# Patient Record
Sex: FEMALE | Race: WHITE | Employment: FULL TIME | URBAN - METROPOLITAN AREA
[De-identification: names, ages, dates, MRNs, and addresses within clinical notes are randomized per-mention and may not be internally consistent; named-entity substitution may affect disease eponyms.]

---

## 2020-08-26 ENCOUNTER — OFFICE VISIT (OUTPATIENT)
Dept: PRIMARY CARE CLINIC | Age: 40
End: 2020-08-26
Payer: COMMERCIAL

## 2020-08-26 VITALS
WEIGHT: 118.4 LBS | SYSTOLIC BLOOD PRESSURE: 118 MMHG | HEIGHT: 63 IN | BODY MASS INDEX: 20.98 KG/M2 | RESPIRATION RATE: 14 BRPM | DIASTOLIC BLOOD PRESSURE: 64 MMHG | HEART RATE: 65 BPM | OXYGEN SATURATION: 97 % | TEMPERATURE: 98.3 F

## 2020-08-26 DIAGNOSIS — L60.8 PITTING OF NAILS: ICD-10-CM

## 2020-08-26 DIAGNOSIS — Z13.220 SCREENING CHOLESTEROL LEVEL: ICD-10-CM

## 2020-08-26 DIAGNOSIS — Z11.4 SCREENING FOR HIV (HUMAN IMMUNODEFICIENCY VIRUS): ICD-10-CM

## 2020-08-26 LAB
A/G RATIO: 2.2 (ref 1.1–2.2)
ALBUMIN SERPL-MCNC: 4.8 G/DL (ref 3.4–5)
ALP BLD-CCNC: 49 U/L (ref 40–129)
ALT SERPL-CCNC: 18 U/L (ref 10–40)
ANION GAP SERPL CALCULATED.3IONS-SCNC: 12 MMOL/L (ref 3–16)
AST SERPL-CCNC: 22 U/L (ref 15–37)
BASOPHILS ABSOLUTE: 0 K/UL (ref 0–0.2)
BASOPHILS RELATIVE PERCENT: 0.7 %
BILIRUB SERPL-MCNC: 0.5 MG/DL (ref 0–1)
BUN BLDV-MCNC: 12 MG/DL (ref 7–20)
CALCIUM SERPL-MCNC: 9.7 MG/DL (ref 8.3–10.6)
CHLORIDE BLD-SCNC: 100 MMOL/L (ref 99–110)
CHOLESTEROL, TOTAL: 206 MG/DL (ref 0–199)
CO2: 25 MMOL/L (ref 21–32)
CREAT SERPL-MCNC: 0.7 MG/DL (ref 0.6–1.1)
EOSINOPHILS ABSOLUTE: 0.1 K/UL (ref 0–0.6)
EOSINOPHILS RELATIVE PERCENT: 1.6 %
GFR AFRICAN AMERICAN: >60
GFR NON-AFRICAN AMERICAN: >60
GLOBULIN: 2.2 G/DL
GLUCOSE BLD-MCNC: 85 MG/DL (ref 70–99)
HCT VFR BLD CALC: 42.5 % (ref 36–48)
HDLC SERPL-MCNC: 68 MG/DL (ref 40–60)
HEMOGLOBIN: 14.3 G/DL (ref 12–16)
LDL CHOLESTEROL CALCULATED: 124 MG/DL
LYMPHOCYTES ABSOLUTE: 1.8 K/UL (ref 1–5.1)
LYMPHOCYTES RELATIVE PERCENT: 29.3 %
MCH RBC QN AUTO: 30.4 PG (ref 26–34)
MCHC RBC AUTO-ENTMCNC: 33.6 G/DL (ref 31–36)
MCV RBC AUTO: 90.4 FL (ref 80–100)
MONOCYTES ABSOLUTE: 0.5 K/UL (ref 0–1.3)
MONOCYTES RELATIVE PERCENT: 7.8 %
NEUTROPHILS ABSOLUTE: 3.7 K/UL (ref 1.7–7.7)
NEUTROPHILS RELATIVE PERCENT: 60.6 %
PDW BLD-RTO: 13.5 % (ref 12.4–15.4)
PLATELET # BLD: 215 K/UL (ref 135–450)
PMV BLD AUTO: 8.7 FL (ref 5–10.5)
POTASSIUM SERPL-SCNC: 3.9 MMOL/L (ref 3.5–5.1)
RBC # BLD: 4.7 M/UL (ref 4–5.2)
SODIUM BLD-SCNC: 137 MMOL/L (ref 136–145)
TOTAL PROTEIN: 7 G/DL (ref 6.4–8.2)
TRIGL SERPL-MCNC: 68 MG/DL (ref 0–150)
VLDLC SERPL CALC-MCNC: 14 MG/DL
WBC # BLD: 6.2 K/UL (ref 4–11)

## 2020-08-26 PROCEDURE — 99386 PREV VISIT NEW AGE 40-64: CPT | Performed by: STUDENT IN AN ORGANIZED HEALTH CARE EDUCATION/TRAINING PROGRAM

## 2020-08-26 SDOH — HEALTH STABILITY: MENTAL HEALTH: HOW OFTEN DO YOU HAVE A DRINK CONTAINING ALCOHOL?: 2-3 TIMES A WEEK

## 2020-08-26 SDOH — HEALTH STABILITY: MENTAL HEALTH: HOW MANY STANDARD DRINKS CONTAINING ALCOHOL DO YOU HAVE ON A TYPICAL DAY?: 1 OR 2

## 2020-08-26 ASSESSMENT — ENCOUNTER SYMPTOMS
NAUSEA: 0
VOMITING: 0
SORE THROAT: 0
DIARRHEA: 0
RHINORRHEA: 0
COUGH: 0
SHORTNESS OF BREATH: 0

## 2020-08-26 ASSESSMENT — PATIENT HEALTH QUESTIONNAIRE - PHQ9
SUM OF ALL RESPONSES TO PHQ QUESTIONS 1-9: 1
SUM OF ALL RESPONSES TO PHQ9 QUESTIONS 1 & 2: 1
SUM OF ALL RESPONSES TO PHQ QUESTIONS 1-9: 1
2. FEELING DOWN, DEPRESSED OR HOPELESS: 1
1. LITTLE INTEREST OR PLEASURE IN DOING THINGS: 0

## 2020-08-26 NOTE — PROGRESS NOTES
education: Not on file    Highest education level: Not on file   Occupational History    Not on file   Social Needs    Financial resource strain: Not on file    Food insecurity     Worry: Not on file     Inability: Not on file    Transportation needs     Medical: Not on file     Non-medical: Not on file   Tobacco Use    Smoking status: Never Smoker    Smokeless tobacco: Never Used   Substance and Sexual Activity    Alcohol use: Yes     Alcohol/week: 3.0 standard drinks     Types: 3 Glasses of wine per week     Frequency: 2-3 times a week     Drinks per session: 1 or 2     Binge frequency: Never    Drug use: Never    Sexual activity: Not on file   Lifestyle    Physical activity     Days per week: Not on file     Minutes per session: Not on file    Stress: Not on file   Relationships    Social connections     Talks on phone: Not on file     Gets together: Not on file     Attends Zoroastrianism service: Not on file     Active member of club or organization: Not on file     Attends meetings of clubs or organizations: Not on file     Relationship status: Not on file    Intimate partner violence     Fear of current or ex partner: Not on file     Emotionally abused: Not on file     Physically abused: Not on file     Forced sexual activity: Not on file   Other Topics Concern    Not on file   Social History Narrative    Not on file        Family History   Problem Relation Age of Onset    Breast Cancer Paternal Grandmother     Cancer Paternal Grandmother        Vitals:    08/26/20 1138   BP: 118/64   Site: Right Upper Arm   Position: Sitting   Cuff Size: Medium Adult   Pulse: 65   Resp: 14   Temp: 98.3 °F (36.8 °C)   SpO2: 97%   Weight: 118 lb 6.4 oz (53.7 kg)   Height: 5' 3\" (1.6 m)     Estimated body mass index is 20.97 kg/m² as calculated from the following:    Height as of this encounter: 5' 3\" (1.6 m). Weight as of this encounter: 118 lb 6.4 oz (53.7 kg). Physical Exam  Vitals signs reviewed. Constitutional:       General: She is not in acute distress. Appearance: Normal appearance. She is not ill-appearing. HENT:      Head: Normocephalic and atraumatic. Eyes:      Extraocular Movements: Extraocular movements intact. Neck:      Musculoskeletal: Neck supple. Cardiovascular:      Rate and Rhythm: Normal rate and regular rhythm. Pulses: Normal pulses. Heart sounds: Normal heart sounds. No murmur. No gallop. Pulmonary:      Effort: Pulmonary effort is normal.      Breath sounds: Normal breath sounds. No wheezing, rhonchi or rales. Musculoskeletal: Normal range of motion. Skin:     General: Skin is warm. Capillary Refill: Capillary refill takes less than 2 seconds. Neurological:      General: No focal deficit present. Mental Status: She is alert. Cranial Nerves: No cranial nerve deficit. Psychiatric:         Mood and Affect: Mood normal.         ASSESSMENT/PLAN:    43-year-old with no significant past medical history here to establish care/annual physical as well as complaints of nail pitting and rainouts phenomenon. Nail pitting could be secondary to Raynauds versus thyroid disease versus rheumatologic disease versus vitamin deficiency. Raynauds phenomenon does not seem to bother her too terribly much with no evidence of gangrene or tissue damage. But with the nail changes will evaluate for rheumatologic disease. Annual Physical  - cmp, HIV, lipid    Pitting of nails    - COMPREHENSIVE METABOLIC PANEL; Future  - TSH without Reflex; Future  - CBC WITH AUTO DIFFERENTIAL; Future  - REINA; Future     Raynaud's phenomenon without gangrene    - REINA; Future      Return in about 4 weeks (around 9/23/2020) for nail pitting and labs. An  electronic signature was used to authenticate this note.     --Samara Lara MD on 3/2/2021 at 5:46 PM

## 2020-08-27 DIAGNOSIS — L60.8 PITTING OF NAILS: ICD-10-CM

## 2020-08-27 DIAGNOSIS — I73.00 RAYNAUD'S PHENOMENON WITHOUT GANGRENE: ICD-10-CM

## 2020-08-27 LAB
HIV AG/AB: NORMAL
HIV ANTIGEN: NORMAL
HIV-1 ANTIBODY: NORMAL
HIV-2 AB: NORMAL
TSH SERPL DL<=0.05 MIU/L-ACNC: 2.1 UIU/ML (ref 0.27–4.2)

## 2020-08-28 LAB — ANTI-NUCLEAR ANTIBODY (ANA): NEGATIVE

## 2021-01-15 ENCOUNTER — TELEPHONE (OUTPATIENT)
Dept: PRIMARY CARE CLINIC | Age: 41
End: 2021-01-15

## 2021-01-15 NOTE — TELEPHONE ENCOUNTER
Pt was asking if  or her MA call her back and discuss the test that was taking for Hiv on 8/27/20 she was not sure why this was done and just thought that the cost was $138.00 and she thought that she would have had to ask for a test like this because she has been  for 20 years. please call at [phone]

## 2021-03-02 ENCOUNTER — TELEPHONE (OUTPATIENT)
Dept: PRIMARY CARE CLINIC | Age: 41
End: 2021-03-02

## 2021-03-02 PROBLEM — I73.00 RAYNAUD'S PHENOMENON WITHOUT GANGRENE: Status: ACTIVE | Noted: 2021-03-02

## 2021-03-02 PROBLEM — L60.8 PITTING OF NAILS: Status: ACTIVE | Noted: 2021-03-02

## 2021-03-02 NOTE — TELEPHONE ENCOUNTER
I reviewed her visit and it should have been billed as a preventative visit in which case screening for HIV is routine preventative care. CDC recommends that everyone between the ages of 15 and 59 get tested for HIV as part of routine health care. As for the other labs, they were to assess her complaints of nail pitting and ridging and Raynauds phenomenon (cold fingers/toes). I will addend the visit to reflect proper coding as an annual physical and it will be sent to billing. Unsure how long that process takes, someone in the billing department would be more able to answer those questions.

## 2021-03-05 ENCOUNTER — TELEPHONE (OUTPATIENT)
Dept: PRIMARY CARE CLINIC | Age: 41
End: 2021-03-05

## 2021-03-05 NOTE — TELEPHONE ENCOUNTER
----- Message from Larey Party sent at 3/3/2021  5:23 PM EST -----  Subject: Message to Provider    QUESTIONS  Information for Provider? pt. spoke with Jas Toro earlier today and is   returning a call in reference to a billing issue she has. She would like   Jas Toro to call her back. ---------------------------------------------------------------------------  --------------  Libra WOLFF  What is the best way for the office to contact you? OK to leave message on   voicemail  Preferred Call Back Phone Number? [phone]  ---------------------------------------------------------------------------  --------------  SCRIPT ANSWERS  Relationship to Patient?  Self

## 2021-03-11 NOTE — TELEPHONE ENCOUNTER
Requested labs that were ordered on 8/26/21 be sent to Fax [phone] ref [phone]. Dr Erik Aldridge updated (see previous phone encounter) Faxed update as requested.

## 2021-10-12 ENCOUNTER — OFFICE VISIT (OUTPATIENT)
Dept: PRIMARY CARE CLINIC | Age: 41
End: 2021-10-12
Payer: COMMERCIAL

## 2021-10-12 VITALS
TEMPERATURE: 98.3 F | HEART RATE: 53 BPM | SYSTOLIC BLOOD PRESSURE: 110 MMHG | BODY MASS INDEX: 21.23 KG/M2 | HEIGHT: 63 IN | OXYGEN SATURATION: 100 % | WEIGHT: 119.8 LBS | DIASTOLIC BLOOD PRESSURE: 62 MMHG

## 2021-10-12 DIAGNOSIS — F41.9 ANXIETY: Primary | ICD-10-CM

## 2021-10-12 PROCEDURE — 99214 OFFICE O/P EST MOD 30 MIN: CPT | Performed by: STUDENT IN AN ORGANIZED HEALTH CARE EDUCATION/TRAINING PROGRAM

## 2021-10-12 RX ORDER — CITALOPRAM 10 MG/1
10 TABLET ORAL DAILY
Qty: 30 TABLET | Refills: 1 | Status: SHIPPED | OUTPATIENT
Start: 2021-10-12 | End: 2021-10-27

## 2021-10-12 RX ORDER — HYDROXYZINE HYDROCHLORIDE 25 MG/1
25 TABLET, FILM COATED ORAL NIGHTLY
Qty: 30 TABLET | Refills: 0 | Status: SHIPPED | OUTPATIENT
Start: 2021-10-12 | End: 2021-11-11

## 2021-10-12 SDOH — ECONOMIC STABILITY: FOOD INSECURITY: WITHIN THE PAST 12 MONTHS, THE FOOD YOU BOUGHT JUST DIDN'T LAST AND YOU DIDN'T HAVE MONEY TO GET MORE.: NEVER TRUE

## 2021-10-12 SDOH — ECONOMIC STABILITY: FOOD INSECURITY: WITHIN THE PAST 12 MONTHS, YOU WORRIED THAT YOUR FOOD WOULD RUN OUT BEFORE YOU GOT MONEY TO BUY MORE.: NEVER TRUE

## 2021-10-12 ASSESSMENT — PATIENT HEALTH QUESTIONNAIRE - PHQ9
SUM OF ALL RESPONSES TO PHQ QUESTIONS 1-9: 0
SUM OF ALL RESPONSES TO PHQ QUESTIONS 1-9: 0
2. FEELING DOWN, DEPRESSED OR HOPELESS: 0
1. LITTLE INTEREST OR PLEASURE IN DOING THINGS: 0
SUM OF ALL RESPONSES TO PHQ QUESTIONS 1-9: 0
SUM OF ALL RESPONSES TO PHQ9 QUESTIONS 1 & 2: 0

## 2021-10-12 ASSESSMENT — SOCIAL DETERMINANTS OF HEALTH (SDOH): HOW HARD IS IT FOR YOU TO PAY FOR THE VERY BASICS LIKE FOOD, HOUSING, MEDICAL CARE, AND HEATING?: NOT HARD AT ALL

## 2021-10-12 NOTE — PROGRESS NOTES
Radha Flaherty (:  1980) is a 41 y.o. female,Established patient, here for evaluation of the following chief complaint(s): Anxiety         ASSESSMENT/PLAN:  1. Anxiety  -     TSH without Reflex; Future  -     T4, FREE; Future  -     CBC WITH AUTO DIFFERENTIAL; Future  -     hydrOXYzine (ATARAX) 25 MG tablet; Take 1 tablet by mouth nightly, Disp-30 tablet, R-0Normal  -     citalopram (CELEXA) 10 MG tablet; Take 1 tablet by mouth daily, Disp-30 tablet, R-1Normal  Can start Celexa 10 mg daily for long-term coverage  Can try hydroxyzine daily as needed     Return in about 4 weeks (around 2021). Subjective   SUBJECTIVE/OBJECTIVE:  HPI    9 years with anxiety   Has been on meds in past but didn't work,  Last Friday felt like panic attack, had some bourbon and it calmed her done. She does not drink excessively  [facility], will be making appointment soon  Feels like she is always anxious, worrying about what she has to do the next day  Going on a plane ride in 3 weeks to see her best friend, nervous about traveling alone without her . Menses normal       Review of Systems   All other systems reviewed and are negative. Objective   Physical Exam  Constitutional:       Appearance: Normal appearance. HENT:      Head: Normocephalic and atraumatic. Nose: Nose normal.      Mouth/Throat:      Mouth: Mucous membranes are moist.   Eyes:      Extraocular Movements: Extraocular movements intact. Cardiovascular:      Rate and Rhythm: Normal rate and regular rhythm. Heart sounds: Normal heart sounds. No murmur heard. No friction rub. No gallop. Pulmonary:      Effort: Pulmonary effort is normal.      Breath sounds: Normal breath sounds. No wheezing, rhonchi or rales. Skin:     General: Skin is warm. Neurological:      General: No focal deficit present. Mental Status: She is alert.    Psychiatric:         Mood and Affect: Mood normal.                  An electronic signature was used to authenticate this note.     --Humberto Underwood MD

## 2021-10-25 ENCOUNTER — TELEPHONE (OUTPATIENT)
Dept: PRIMARY CARE CLINIC | Age: 41
End: 2021-10-25

## 2021-10-27 RX ORDER — ESCITALOPRAM OXALATE 10 MG/1
10 TABLET ORAL DAILY
Qty: 30 TABLET | Refills: 1 | Status: SHIPPED | OUTPATIENT
Start: 2021-10-27 | End: 2022-09-09 | Stop reason: ALTCHOICE

## 2021-10-27 NOTE — TELEPHONE ENCOUNTER
Both medications are part of the same drug class and typically the same side effect profile, I do not mind starting Lexapro if she would like to try that but wanted her to know they were in the same way. There is no way to tell who will react in what way to these medications, while one medication may work for 1 person it may have a lot of side effects for another person. She will need to follow-up in 1 month after starting Lexapro.

## 2021-12-06 ENCOUNTER — OFFICE VISIT (OUTPATIENT)
Dept: PRIMARY CARE CLINIC | Age: 41
End: 2021-12-06
Payer: COMMERCIAL

## 2021-12-06 VITALS
TEMPERATURE: 97.3 F | OXYGEN SATURATION: 99 % | DIASTOLIC BLOOD PRESSURE: 64 MMHG | HEART RATE: 70 BPM | BODY MASS INDEX: 21.61 KG/M2 | WEIGHT: 122 LBS | SYSTOLIC BLOOD PRESSURE: 124 MMHG

## 2021-12-06 DIAGNOSIS — Z00.00 ENCOUNTER FOR ANNUAL PHYSICAL EXAM: Primary | ICD-10-CM

## 2021-12-06 DIAGNOSIS — F41.1 GENERALIZED ANXIETY DISORDER: ICD-10-CM

## 2021-12-06 PROCEDURE — 99396 PREV VISIT EST AGE 40-64: CPT | Performed by: STUDENT IN AN ORGANIZED HEALTH CARE EDUCATION/TRAINING PROGRAM

## 2021-12-06 RX ORDER — HYDROXYZINE HYDROCHLORIDE 25 MG/1
25 TABLET, FILM COATED ORAL NIGHTLY
Qty: 30 TABLET | Refills: 3 | Status: SHIPPED | OUTPATIENT
Start: 2021-12-06 | End: 2022-01-05

## 2021-12-06 NOTE — PROGRESS NOTES
Quinn Currie (:  1980) is a 35 y.o. female,{New vs Established:[id number]::\"Established patient\"}, here for evaluation of the following chief complaint(s): Annual Exam (follow up )         ASSESSMENT/PLAN:  {There are no diagnoses linked to this encounter. (Refresh or delete this SmartLink)}    Pt having sexual side effects with celexa would like to try lexapro instead since she already filled it. No follow-ups on file. Subjective   SUBJECTIVE/OBJECTIVE:  HPI  Not taking lexapro due to cost, and taking 10 mg celexa,        Review of Systems       Objective   Physical Exam       {Time Documentation Optional:[id number]}      An electronic signature was used to authenticate this note.     --Reid Lal MD

## 2021-12-06 NOTE — PATIENT INSTRUCTIONS
Patient Education        buspirone  Pronunciation:  lis ERWIN canales  Brand: BuSpar  What is the most important information I should know about buspirone? Do not use this medicine if you have used an MAO inhibitor in the past 14 days, such as isocarboxazid, linezolid, methylene blue injection, phenelzine, rasagiline, selegiline, or tranylcypromine. What is buspirone? Buspirone is used to treat symptoms of anxiety, such as fear, tension, irritability, dizziness, pounding heartbeat, and other physical symptoms. Buspirone is not an anti-psychotic medication and should not be used in place of medication prescribed by your doctor for mental illness. Buspirone may also be used for purposes not listed in this medication guide. What should I discuss with my healthcare provider before taking buspirone? You should not use buspirone if you are allergic to it. Do not use buspirone if you have used an MAO inhibitor in the past 14 days. A dangerous drug interaction could occur. MAO inhibitors include isocarboxazid, linezolid, methylene blue injection, phenelzine, rasagiline, selegiline, tranylcypromine, and others. You may need to wait 14 days after stopping buspirone before you can take an MAOI. Tell your doctor if you have ever had:  · kidney disease; or  · liver disease. Be sure your doctor knows if you also take stimulant medicine, opioid medicine, herbal products, or medicine for depression, mental illness, Parkinson's disease, migraine headaches, serious infections, or prevention of nausea and vomiting. These medicines may interact with buspirone and cause a serious condition called serotonin syndrome. Tell your doctor if you are pregnant. You should not breastfeed while using buspirone. Do not give this medicine to a child without medical advice. How should I take buspirone? Follow all directions on your prescription label and read all medication guides or instruction sheets.  Your doctor may occasionally change your dose. Use the medicine exactly as directed. You may take buspirone with or without food but take it the same way each time. If you have switched to buspirone from another anxiety medication, you may need to slowly decrease your dose of the other medication rather than stopping suddenly. Some anxiety medications can cause withdrawal symptoms when you stop taking them suddenly after long-term use. Read and carefully follow any Instructions for Use provided with your medicine. Ask your doctor or pharmacist if you do not understand these instructions. Some buspirone tablets are scored so you can break the tablet into 2 or 3 pieces in order to take a smaller dose. Do not use a buspirone tablet if it has not been broken correctly and the piece is too big or too small. Follow your doctor's instructions about how much of the tablet to take. Buspirone can cause false positive results with certain medical tests. You may need to stop using the medicine for at least 48 hours before your test. Tell any doctor who treats you that you are using buspirone. Store at room temperature away from moisture, heat, and light. What happens if I miss a dose? Take the medicine as soon as you can, but skip the missed dose if it is almost time for your next dose. Do not take two doses at one time. What happens if I overdose? Seek emergency medical attention or call the Poison Help line at 1-640.541.3707. What should I avoid while taking buspirone? Avoid driving or hazardous activity until you know how this medicine will affect you. Your reactions could be impaired. Drinking alcohol may increase certain side effects of buspirone. Grapefruit may interact with buspirone and lead to unwanted side effects. Avoid the use of grapefruit products. What are the possible side effects of buspirone?   Get emergency medical help if you have signs of an allergic reaction: hives; difficult breathing; swelling of your face, lips, tongue, or throat. Call your doctor at once if you have:  · chest pain;  · shortness of breath; or  · a light-headed feeling, like you might pass out. Seek medical attention right away if you have symptoms of serotonin syndrome, such as: agitation, hallucinations, fever, sweating, shivering, fast heart rate, muscle stiffness, twitching, loss of coordination, nausea, vomiting, or diarrhea. Common side effects may include:  · headache;  · dizziness, feeling light-headed;  · nausea; or  · feeling nervous or excited. This is not a complete list of side effects and others may occur. Call your doctor for medical advice about side effects. You may report side effects to FDA at 0-934-FDA-7230. What other drugs will affect buspirone? Using buspirone with other drugs that make you drowsy or slow your breathing can worsen these effects. Ask your doctor before using opioid medication, a sleeping pill, a muscle relaxer, or medicine for anxiety or seizures. Tell your doctor about all your current medicines. Many drugs can affect buspirone, especially:  · nefazodone;  · [place]'s wort;  · an antibiotic --clarithromycin, erythromycin, rifabutin, rifampin, rifapentine, telithromycin;  · antifungal medicine --itraconazole, ketoconazole;  · antiviral medicine to treat HIV/AIDS --efavirenz, indinavir, nelfinavir, nevirapine, ritonavir, saquinavir;  · cancer medicine --apalutamide, enzalutamide, mitotane;  · heart or blood pressure medicines --diltiazem, verapamil;  · seizure medicine --carbamazepine, oxcarbazepine, phenytoin, primidone; or  · steroid medicine --dexamethasone, prednisone. This list is not complete and many other drugs may affect buspirone. This includes prescription and over-the-counter medicines, vitamins, and herbal products. Not all possible drug interactions are listed here. Where can I get more information? Your pharmacist can provide more information about buspirone.   Remember, keep this and all other medicines out of the reach of children, never share your medicines with others, and use this medication only for the indication prescribed. Every effort has been made to ensure that the information provided by [address] is accurate, up-to-date, and complete, but no guarantee is made to that effect. Drug information contained herein may be time sensitive. [facility] information has been compiled for use by healthcare practitioners and consumers in the [place] and therefore [facility] does not warrant that uses outside of the [place] are appropriate, unless specifically indicated otherwise. [facility]'s drug information does not endorse drugs, diagnose patients or recommend therapy. [facility]'s drug information is an informational resource designed to assist licensed healthcare practitioners in caring for their patients and/or to serve consumers viewing this service as a supplement to, and not a substitute for, the expertise, skill, knowledge and judgment of healthcare practitioners. The absence of a warning for a given drug or drug combination in no way should be construed to indicate that the drug or drug combination is safe, effective or appropriate for any given patient. [facility] does not assume any responsibility for any aspect of healthcare administered with the aid of information [facility] provides. The information contained herein is not intended to cover all possible uses, directions, precautions, warnings, drug interactions, allergic reactions, or adverse effects. If you have questions about the drugs you are taking, check with your doctor, nurse or pharmacist.  Copyright 3073-9109 [address]. Version: 6.01. Revision date: 2/24/2020. Care instructions adapted under license by [facility] ([facility]).  If you have questions about a medical condition or this instruction, always ask your healthcare professional. [place] [address] any warranty or liability for your use of this information.

## 2021-12-06 NOTE — PROGRESS NOTES
2021    Kun Flores (:  1980) is a 35 y.o. female, here for a preventive medicine evaluation. Patient Active Problem List   Diagnosis    Raynaud's phenomenon without gangrene    Pitting of nails    Generalized anxiety disorder     Not taking lexapro due to cost, and taking 10 mg celexa, working really well for anxiety but decreased libido. Diannah Persons from a friend that Lexapro worked better for libido. Otherwise no other issues. Review of Systems   All other systems reviewed and are negative. Prior to Visit Medications    Medication Sig Taking? Authorizing Provider   hydrOXYzine (ATARAX) 25 MG tablet Take 1 tablet by mouth nightly Yes Felicita Gomez MD   escitalopram (LEXAPRO) 10 MG tablet Take 1 tablet by mouth daily Yes Felicita Gomez MD        No Known Allergies    Past Medical History:   Diagnosis Date    Anxiety        Past Surgical History:   Procedure Laterality Date    BREAST LUMPECTOMY Left        Social History     Socioeconomic History    Marital status:      Spouse name: Not on file    Number of children: Not on file    Years of education: Not on file    Highest education level: Not on file   Occupational History    Not on file   Tobacco Use    Smoking status: Never Smoker    Smokeless tobacco: Never Used   Substance and Sexual Activity    Alcohol use: Yes     Alcohol/week: 3.0 standard drinks     Types: 3 Glasses of wine per week    Drug use: Never    Sexual activity: Not on file   Other Topics Concern    Not on file   Social History Narrative    Not on file     Social Determinants of Health     Financial Resource Strain: Low Risk     Difficulty of Paying Living Expenses: Not hard at all   Food Insecurity: No Food Insecurity    Worried About [address] in the Last Year: Never true    [address] in the Last Year: Never true   Transportation Needs:     Lack of Transportation (Medical):  Not on file    Lack of Transportation DTaP/Tdap/Td vaccine (1 - Tdap) Never done    Cervical cancer screen  Never done    Flu vaccine (1) Never done    COVID-19 Vaccine (3 - Booster for Pfizer series) 10/18/2021    Lipid screen  08/26/2025    HIV screen  Completed    Hepatitis A vaccine  Aged Out    Hepatitis B vaccine  Aged Out    Hib vaccine  Aged Out    Meningococcal (ACWY) vaccine  Aged Out    Pneumococcal 0-64 years Vaccine  Aged Out          ASSESSMENT/PLAN:  1. Encounter for annual physical exam  2. Generalized anxiety disorder  Patient can try the Lexapro that she has and stop the Celexa to see if she has better side effect profile as far as decreased libido. Also discussed option of trying Wellbutrin as well. Can follow-up in 1 month. No follow-ups on file. An electronic signature was used to authenticate this note.     --Humberto Underwood MD on 12/6/2021 at 4:13 PM

## 2022-01-03 ENCOUNTER — PATIENT MESSAGE (OUTPATIENT)
Dept: PRIMARY CARE CLINIC | Age: 42
End: 2022-01-03

## 2022-01-04 NOTE — TELEPHONE ENCOUNTER
From: Iraj Seay  To: Dr. Luisana Purcell: 1/3/2022 7:28 PM EST  Subject: 30 Day Follow up of [facility] Dr. Sheran Mcardle been taking the Lexapro for almost a month and have not noticed any differences in side effects from the Celexa. Since the Celexa is less expensive, I was thinking of going back to it if you think that would be ok? I took 30 days of Celexa, then 30 days of Lexapro. My other question is about increasing the dose. Both medications have helped with spiraling thoughts and panic, but I'm still experiencing tightness in my chest on almost a daily basis. If you think increasing the dosage would be helpful, I'd prefer doing the smallest amount of increase just to see if it helps. Thanks for your time and help!     Iraj Seay

## 2022-01-05 ENCOUNTER — TELEPHONE (OUTPATIENT)
Dept: PRIMARY CARE CLINIC | Age: 42
End: 2022-01-05

## 2022-01-05 RX ORDER — CITALOPRAM 10 MG/1
TABLET ORAL
Qty: 45 TABLET | Refills: 0 | Status: SHIPPED | OUTPATIENT
Start: 2022-01-05 | End: 2022-02-15 | Stop reason: SDUPTHER

## 2022-01-05 NOTE — TELEPHONE ENCOUNTER
Pt is fu on her previous [address]  message she also states she is almost out of medication.  Please review

## 2022-01-05 NOTE — PROGRESS NOTES
Spoke with patient on phone about med options. Patient would like to increase celexa to 15mg rather than 20mg. Continues with counselor.  Advised to follow up with PCP in [facility]

## 2022-02-15 RX ORDER — CITALOPRAM 10 MG/1
TABLET ORAL
Qty: 135 TABLET | Refills: 3 | Status: SHIPPED | OUTPATIENT
Start: 2022-02-15 | End: 2022-09-09

## 2022-02-15 NOTE — TELEPHONE ENCOUNTER
Medication:   Requested Prescriptions     Pending Prescriptions Disp Refills    citalopram (CELEXA) 10 MG tablet 45 tablet 0     Sig: Take 1.5 tabs PO daily        Last Filled:      Patient Phone Number: [phone] (home)     Last appt: 12/6/2021   Next appt: Visit date not found    Last OARRS: No flowsheet data found.     Preferred Pharmacy:   [address] [place] [address] [phone] [facility] [phone]  [address] [place] [address]  Phone: [phone] Fax: [phone]

## 2022-09-09 ENCOUNTER — HOSPITAL ENCOUNTER (OUTPATIENT)
Dept: GENERAL RADIOLOGY | Age: 42
Discharge: HOME | End: 2022-09-09
Payer: COMMERCIAL

## 2022-09-09 ENCOUNTER — OFFICE VISIT (OUTPATIENT)
Dept: PRIMARY CARE CLINIC | Age: 42
End: 2022-09-09
Payer: COMMERCIAL

## 2022-09-09 VITALS
BODY MASS INDEX: 21.55 KG/M2 | HEART RATE: 85 BPM | TEMPERATURE: 97.6 F | HEIGHT: 63 IN | RESPIRATION RATE: 16 BRPM | SYSTOLIC BLOOD PRESSURE: 120 MMHG | DIASTOLIC BLOOD PRESSURE: 70 MMHG | OXYGEN SATURATION: 96 % | WEIGHT: 121.6 LBS

## 2022-09-09 DIAGNOSIS — M25.561 CHRONIC PAIN OF RIGHT KNEE: ICD-10-CM

## 2022-09-09 DIAGNOSIS — G89.29 CHRONIC PAIN OF RIGHT KNEE: ICD-10-CM

## 2022-09-09 DIAGNOSIS — M17.11 PRIMARY OSTEOARTHRITIS OF RIGHT KNEE: Primary | ICD-10-CM

## 2022-09-09 DIAGNOSIS — Z00.00 ENCOUNTER FOR ANNUAL PHYSICAL EXAM: Primary | ICD-10-CM

## 2022-09-09 DIAGNOSIS — Z12.31 ENCOUNTER FOR SCREENING MAMMOGRAM FOR MALIGNANT NEOPLASM OF BREAST: ICD-10-CM

## 2022-09-09 PROCEDURE — 73562 X-RAY EXAM OF KNEE 3: CPT

## 2022-09-09 PROCEDURE — 99396 PREV VISIT EST AGE 40-64: CPT | Performed by: STUDENT IN AN ORGANIZED HEALTH CARE EDUCATION/TRAINING PROGRAM

## 2022-09-09 RX ORDER — CITALOPRAM 10 MG/1
10 TABLET ORAL DAILY
Qty: 90 TABLET | Refills: 1 | Status: SHIPPED | OUTPATIENT
Start: 2022-09-09

## 2022-09-09 ASSESSMENT — PATIENT HEALTH QUESTIONNAIRE - PHQ9
1. LITTLE INTEREST OR PLEASURE IN DOING THINGS: 0
SUM OF ALL RESPONSES TO PHQ9 QUESTIONS 1 & 2: 0
SUM OF ALL RESPONSES TO PHQ QUESTIONS 1-9: 0
2. FEELING DOWN, DEPRESSED OR HOPELESS: 0
SUM OF ALL RESPONSES TO PHQ QUESTIONS 1-9: 0

## 2022-09-09 NOTE — PROGRESS NOTES
2022    Ingrid Schmitz (:  1980) is a 35 y.o. female, here for a preventive medicine evaluation. Patient Active Problem List   Diagnosis    Raynaud's phenomenon without gangrene    Pitting of nails    Generalized anxiety disorder     Right knee pain since highschool, runs marathons, squatting lunging and stairs, not locking up but has given out on her before. Hasnt had pap in 10 yrs    Menses will be really heavy on day 1 and 2 but then will be fine, regular    Fam history: PGM with breast cancer, never had an abnormal pap smear. Review of Systems   All other systems reviewed and are negative. Prior to Visit Medications    Medication Sig Taking? Authorizing Provider   citalopram (CELEXA) 10 MG tablet Take 1 tablet by mouth daily Yes Og Silva MD        No Known Allergies    Past Medical History:   Diagnosis Date    Anxiety        Past Surgical History:   Procedure Laterality Date    BREAST LUMPECTOMY Left        Social History     Socioeconomic History    Marital status:      Spouse name: Not on file    Number of children: Not on file    Years of education: Not on file    Highest education level: Not on file   Occupational History    Not on file   Tobacco Use    Smoking status: Never    Smokeless tobacco: Never   Substance and Sexual Activity    Alcohol use:  Yes     Alcohol/week: 3.0 standard drinks     Types: 3 Glasses of wine per week    Drug use: Never    Sexual activity: Not on file   Other Topics Concern    Not on file   Social History Narrative    Not on file     Social Determinants of Health     Financial Resource Strain: Low Risk     Difficulty of Paying Living Expenses: Not hard at all   Food Insecurity: No Food Insecurity    Worried About Running Out of Food in the Last Year: Never true    Ran Out of Food in the Last Year: Never true   Transportation Needs: Not on file   Physical Activity: Not on file   Stress: Not on file   Social Connections: Not on file distension. Palpations: Abdomen is soft. There is no mass. Genitourinary:     Exam position: Knee-chest position. Pubic Area: No rash. Labia:         Right: No rash or lesion. Left: No rash or lesion. Vagina: Normal.      Cervix: Normal.      Uterus: Normal.       Adnexa: Right adnexa normal and left adnexa normal.   Musculoskeletal:         General: Normal range of motion. Cervical back: Neck supple. No muscular tenderness. Comments: Right knee no swelling TTP, normal rom   Lymphadenopathy:      Cervical: No cervical adenopathy. Upper Body:      Right upper body: No axillary or pectoral adenopathy. Left upper body: No axillary or pectoral adenopathy. Skin:     General: Skin is warm. Capillary Refill: Capillary refill takes less than 2 seconds. Findings: No rash. Neurological:      General: No focal deficit present. Mental Status: She is alert. Cranial Nerves: No cranial nerve deficit. Psychiatric:         Mood and Affect: Mood normal.         Behavior: Behavior normal.       No flowsheet data found.     Lab Results   Component Value Date/Time    CHOL 206 08/26/2020 12:44 PM    TRIG 68 08/26/2020 12:44 PM    HDL 68 08/26/2020 12:44 PM    [address] 124 08/26/2020 12:44 PM    GLUCOSE 85 08/26/2020 12:44 PM       The 10-year ASCVD risk score (Alyx Rodriguez., et al., 2013) is: 0.5%    Values used to calculate the score:      Age: 35 years      Sex: Female      Is Non- : No      Diabetic: No      Tobacco smoker: No      Systolic Blood Pressure: 108 mmHg      Is BP treated: No      HDL Cholesterol: 68 mg/dL      Total Cholesterol: 206 mg/dL    Immunization History   Administered Date(s) Administered    COVID-19, PFIZER GRAY top, DO NOT Dilute, (age 15 y+), IM, 30 mcg/0.3 mL 05/20/2022    COVID-19, PFIZER PURPLE top, DILUTE for use, (age 15 y+), 30mcg/0.3mL 03/28/2021, 04/18/2021       Health Maintenance   Topic Date Due Hepatitis C screen  Never done    DTaP/Tdap/Td vaccine (1 - Tdap) Never done    Flu vaccine (1) Never done    Depression Screen  09/09/2023    Lipids  08/26/2025    Cervical cancer screen  09/09/2027    COVID-19 Vaccine  Completed    HIV screen  Completed    Hepatitis A vaccine  Aged Out    Hepatitis B vaccine  Aged Out    Hib vaccine  Aged Out    Meningococcal (ACWY) vaccine  Aged Out    Pneumococcal 0-64 years Vaccine  Aged Out       Assessment & Plan   Encounter for annual physical exam  -     PAP SMEAR  -     Lipid Panel; Future  -     Basic Metabolic Panel; Future  -     Hepatitis C Antibody; Future  General wellness exam. Reviewed chart for past hx and updated today. Counseled on age appropriate health guidance and discussed screening recommendations. Vaccinations reviewed and discussed. All questions answered    Encounter for screening mammogram for malignant neoplasm of breast  -     GIBRAN DIGITAL SCREEN W OR WO CAD BILATERAL; Future  -     PAP SMEAR  Chronic pain of right knee  -     XR KNEE RIGHT (3 VIEWS); Future  Will likely need PT  NSAIDS, RICE  Return in about 1 year (around 9/9/2023), or if symptoms worsen or fail to improve.          --Martin Kaur MD

## 2022-09-09 NOTE — PROGRESS NOTES
Ashlee Trujillo (:  1980) is a 35 y.o. female,Established patient, here for evaluation of the following chief complaint(s):  Gynecologic Exam and Knee Pain         ASSESSMENT/PLAN:  {There are no diagnoses linked to this encounter. (Refresh or delete this SmartLink)}    No follow-ups on file. Subjective   SUBJECTIVE/OBJECTIVE:  HPI    Right knee pain since highschool, runs marathons, squatting lunging and stairs, not locking up but has given out on her before. Hasnt had pap in 10 yrs    Menses will be really heavy on day 1 and 2 but then will be fine, regular    Fam history: PGM ith breast cancer, never had an abnormal pap smear. Review of Systems       Objective   Physical Exam       {Time Documentation Optional:[id number]}      An electronic signature was used to authenticate this note.     --Julien Dickesron MD

## 2022-09-13 LAB
HPV COMMENT: NORMAL
HPV TYPE 16: NOT DETECTED
HPV TYPE 18: NOT DETECTED
HPVOH (OTHER TYPES): NOT DETECTED

## 2022-09-27 ENCOUNTER — HOSPITAL ENCOUNTER (OUTPATIENT)
Dept: PHYSICAL THERAPY | Age: 42
Setting detail: THERAPIES SERIES
Discharge: HOME | End: 2022-09-27
Payer: COMMERCIAL

## 2022-09-27 PROCEDURE — 97161 PT EVAL LOW COMPLEX 20 MIN: CPT

## 2022-09-27 PROCEDURE — 97110 THERAPEUTIC EXERCISES: CPT

## 2022-09-27 PROCEDURE — 97112 NEUROMUSCULAR REEDUCATION: CPT

## 2022-09-27 NOTE — FLOWSHEET NOTE
The [address] [place], [place] [address], [address], [address] [place] [address]  Phone: [phone]   Fax:     [phone]    Physical Therapy Daily Treatment Note  Date:  2022    Patient Name:  Sherron Nice    :  1980  MRN: [id number]  Restrictions/Precautions:    Medical/Treatment Diagnosis Information:  Diagnosis: M17.11 (ICD-10-CM) - Primary osteoarthritis of right knee  Treatment Diagnosis: M25.561 Right knee pain  Insurance/Certification information:  PT Insurance Information: [facility]  Physician Information:   Christofer Johnson MD  Has the plan of care been signed (Y/N):        []  Yes  [x]  No     Date of Patient follow up with Physician:       Is this a Progress Report:     []  Yes  [x]  No        If Yes:  Date Range for reporting period:  Beginning 22  Ending    Progress report will be due (10 Rx or 30 days whichever is less):        Recertification will be due (POC Duration  / 90 days whichever is less): 22         Visit # Insurance Allowable Auth Required   [facility]  [facility] visit [x]  Yes []  No        Functional Scale: FOTO knee 73  Date assessed:  22       Latex Allergy:  [x]NO      []YES  Preferred Language for Healthcare:   [x]English       []other:    Pain level:  0-2/10     SUBJECTIVE:  See eval    OBJECTIVE: See eval  Observation:   Test measurements:      RESTRICTIONS/PRECAUTIONS:     Exercises/Interventions:   Exercise/Equipment Resistance/Repetitions Other comments   Stretching/AROM       Hamstring stretch 30\"hx3 R  started setaed EOT but progressed to supine with strap d/t R hip flexor tightness   ITB stretch 30\"hx3 R  supine with strap   Prone quad stretch 30\"hx3 R  towel roll with strap               Strengthening      SLR + 30\"hx3 R  long sitting                                  Neuromuscular re-ed      Quad set 10\"hx10 R  towel roll under knee, cues for VMO Provided manual therapy to mobilize LE, proximal hip and/or LS spine soft tissue/joints for the purpose of modulating pain, promoting relaxation,  increasing ROM, reducing/eliminating soft tissue swelling/inflammation/restriction, improving soft tissue extensibility and allowing for proper ROM for normal function with self care, mobility, lifting and ambulation. Modalities:      Charges:  Timed Code Treatment Minutes: 24'+eval   Total Treatment Minutes: 2:04-3:04  60'       [x] EVAL (LOW) 35982 (typically 20 minutes face-to-face)  [] EVAL (MOD) 30673 (typically 30 minutes face-to-face)  [] EVAL (HIGH) 80521 (typically 45 minutes face-to-face)  [] RE-EVAL   [x] PN(31313) x  1   [] IONTO  [x] NMR (00699) x 1    [] VASO  [] Manual (74061) x      [] Other:  [] TA x      [] Mech Traction (66000)  [] ES(attended) (17636)      [] ES (un) (46808):     GOALS:   Patient stated goal: Run and exercise without pain     [] Progressing: [] Met: [] Not Met: [] Adjusted     Therapist goals for Patient:   Short Term Goals: To be achieved in: 3 weeks  1. Independent in HEP and progression per patient tolerance, in order to prevent re-injury. [] Progressing: [] Met: [] Not Met: [] Adjusted   2. Patient will have a decrease in pain to facilitate improvement in movement, function, and ADLs as indicated by Functional Deficits. [] Progressing: [] Met: [] Not Met: [] Adjusted     Long Term Goals: To be achieved in: 6 weeks  1. Patient will score 73 or higher on FOTO knee assessment indicating subjective improvement with functional activities. [] Progressing: [] Met: [] Not Met: [] Adjusted  2. Patient will demonstrate increased R knee AROM to 145 deg knee flexion in order to squat and kneel without any pain or limitations. [] Progressing: [] Met: [] Not Met: [] Adjusted  3.  Patient will demonstrate an increase in LE strength to 5/5 in all directions tested allowing pt to complete all ADL's in weight bearing with no restriction PT    Note: If patient does not return for scheduled/ recommended follow up visits, this note will serve as a discharge from care along with most recent update on progress.

## 2022-09-27 NOTE — PLAN OF CARE
The [address] [place] [address] and [address]  Phone [phone]  Fax [phone]                                                       Physical Therapy Certification    Dear Dr. Rubio Rodriguez,    We had the pleasure of evaluating the following patient for physical therapy services at [address] [place] [address]. A summary of our findings can be found in the initial assessment below. This includes our plan of care. If you have any questions or concerns regarding these findings, please do not hesitate to contact me at the office phone number checked above. Thank you for the referral.       Physician Signature:_______________________________Date:__________________  By signing above (or electronic signature), therapists plan is approved by physician      Patient: Hilda Gill   : 1980   MRN: [id number]  Referring Physician:  Mee Aguilera MD       Evaluation Date: 2022      Medical Diagnosis Information:  Diagnosis: M17.11 (ICD-10-CM) - Primary osteoarthritis of right knee   Treatment Diagnosis: M25.561 Right knee pain                                         Insurance information: PT Insurance Information: [employer]     Precautions/ Contra-indications: N/A    C-SSRS Triggered by Intake questionnaire (Past 2 wk assessment):   [x] No, Questionnaire did not trigger screening.   [] Yes, Patient intake triggered further evaluation      [] C-SSRS Screening completed  [] PCP notified via Plan of Care  [] Emergency services notified     Latex Allergy:  [x]NO      []YES  Preferred Language for Healthcare:   [x]English       []other:    SUBJECTIVE: Patient arrived to physical therapy with c/o R knee pain that has been present for years with no known MARIA DOLORES. Pt reports she was training for hiking a mountain  and since then symptoms have progressively increased.   Has noticed increase in symptoms working out independently with squats, lunges or activities that require high impact such as running and jumping. Has had to stop running d/t symptoms. Denies noticeable swelling     Relevant Medical History: No prior knee pathologies. L hip pain 2018 treated positively. Unremarkable for health conditions.    Functional Disability Index: FOTO knee 63    Pain Scale: 0-2/10  Easing factors: rest   Provocative factors: Stairs, squatting, sitting figure 4 position, running and jumping    Type: []Constant   [x]Intermittent  []Radiating [x]Localized []other:     Numbness/Tingling: Denies    Occupation/School: Part time customer service, desk work     Living Status/Prior Level of Function: Independent with ADLs and IADLs, at home work outs, running    OBJECTIVE:      Flexibility L R Comment   Hamstring 168 147 Measured 90/90   Gastroc 103 104    Jeremias WNL WNL     Ely's WNL Mild restiction             ROM PROM AROM Comment    L R L R    Flexion   143 138    Extension   +8 +6                        Strength L R Comment   Quad 5 5    Hamstring 5 4+    Hip  flexion 5 4    Hip abd 5 4+    Hip add 4+ 5              Special Test Results/Comment   Patella compression Neg bilat   Crepitus Present bilat with knee AROM R>L   Flexion Test Neg bilat   Valgus Laxity Neg bilat for instability or pain but mild apprehension noted on R    Varus Laxity Neg bilat for instability or pain but mild apprehension noted on R    Lachmans Neg bilat   Anterior drawer Neg bilat   Anterior drawer Neg bilat   Naye's Neg bilat but apprehension noted on R          Reflexes/Sensation:    [x]Dermatomes/Myotomes intact    []Reflexes equal and normal bilaterally   []Other:    Joint mobility: Patella    [x]Normal    []Hypo   []Hyper    Palpation: No tenderness    Functional Mobility/Transfers:   SLS: eyes open, WNL with no postural sway or c/o pain   DL squat: Depth 75% of normal with excessive forward knee flexion at end range and c/o pain on R Posture: Mild knee hyperextension and valgus in standing. Mild lateral patella tilt bilaterally     Bandages/Dressings/Incisions: N/A    Gait: Independent, WNL     Orthopedic Special Tests:                        [x] Patient history, allergies, meds reviewed. Medical chart reviewed. See intake form. Review Of Systems (ROS):  [x]Performed Review of systems (Integumentary, CardioPulmonary, Neurological) by intake and observation. Intake form has been scanned into medical record. Patient has been instructed to contact their primary care physician regarding ROS issues if not already being addressed at this time.       Co-morbidities/Complexities (which will affect course of rehabilitation):   [x]None           Arthritic conditions   []Rheumatoid arthritis (M05.9)  []Osteoarthritis (M19.91)   Cardiovascular conditions   []Hypertension (I10)  []Hyperlipidemia (E78.5)  []Angina pectoris (I20)  []Atherosclerosis (I70)   Musculoskeletal conditions   []Disc pathology   []Congenital spine pathologies   []Prior surgical intervention  []Osteoporosis (M81.8)  []Osteopenia (M85.8)   Endocrine conditions   []Hypothyroid (E03.9)  []Hyperthyroid Gastrointestinal conditions   []Constipation (A08.95)   Metabolic conditions   []Morbid obesity (E66.01)  []Diabetes type 1(E10.65) or 2 (E11.65)   []Neuropathy (G60.9)     Pulmonary conditions   []Asthma (J45)  []Coughing   []COPD (J44.9)   Psychological Disorders  []Anxiety (F41.9)  []Depression (F32.9)   []Other:   []Other:          Barriers to/and or personal factors that will affect rehab potential:              []Age  []Sex              []Motivation/Lack of Motivation                        []Co-Morbidities              []Cognitive Function, education/learning barriers              []Environmental, home barriers              []profession/work barriers  [x]past PT/medical experience  []other:  Justification: Chronic nature of symptoms is a potential barrier to treatment     Falls Risk Assessment (30 days):   [x] Falls Risk assessed and no intervention required. [] Falls Risk assessed and Patient requires intervention due to being higher risk   TUG score (>12s at risk):     [] Falls education provided, including     ASSESSMENT:   Functional Impairments:     []Noted lumbar/proximal hip/LE hypomobility   [x]Decreased LE functional ROM   [x]Decreased core/proximal hip strength and neuromuscular control   [x]Decreased LE functional strength   []Reduced balance/proprioceptive control   []other:      Functional Activity Limitations (from functional questionnaire and intake)   [x]Reduced ability to tolerate prolonged functional positions   [x]Reduced ability or difficulty with changes of positions or transfers between positions   [x]Reduced ability to maintain good posture and demonstrate good body mechanics with sitting, bending, and lifting   []Reduced ability to sleep   [] Reduced ability or tolerance with driving and/or computer work   []Reduced ability to perform lifting, carrying tasks   [x]Reduced ability to squat   []Reduced ability to forward bend   []Reduced ability to ambulate prolonged functional periods/distances/surfaces   [x]Reduced ability to ascend/descend stairs   [x]Reduced ability to run, hop or jump   []other:     Participation Restrictions   []Reduced participation in self care activities   [x]Reduced participation in home management activities   [x]Reduced participation in work activities   [x]Reduced participation in social activities. [x]Reduced participation in sport activities. Classification :    []Signs/symptoms consistent with post-surgical status including decreased ROM, strength and function.    []Signs/symptoms consistent with joint sprain/strain   [x]Signs/symptoms consistent with patella-femoral syndrome   []Signs/symptoms consistent with knee OA/hip OA   []Signs/symptoms consistent with internal derangement of knee/Hip   []Signs/symptoms consistent with that may include: thermal agents, E-stim, Biofeedback, US, iontophoresis as indicated  [x] Patient education on joint protection, postural re-education, activity modification, progression of HEP. HEP instruction:   Access Code: [email or web address]  URL: [email or web address]  Date: 09/27/2022  Prepared by: Rafaela Lewis    Exercises  Supine Hamstring Stretch with Strap - 1-3 x daily - 7 x weekly - 1 sets - 3 reps - 30 hold  Supine ITB Stretch with Strap - 1-3 x daily - 7 x weekly - 1 sets - 3 reps - 30 hold  Prone Quad Stretch with Towel Roll and Strap - 1-3 x daily - 7 x weekly - 1 sets - 3 reps - 30 hold  Supine Quad Set - 1 x daily - 7 x weekly - 1 sets - 10 reps - 10 hold  Seated Long Arc Quad (90-45 Degree Range) - 1 x daily - 7 x weekly - 1 sets - 10 reps - 5 hold  Straight Leg Raise with Arm Support - 1 x daily - 7 x weekly - 1 sets - 3 reps - 30 hold      GOALS:   Patient stated goal: Run and exercise without pain     [] Progressing: [] Met: [] Not Met: [] Adjusted    Therapist goals for Patient:   Short Term Goals: To be achieved in: 3 weeks  1. Independent in HEP and progression per patient tolerance, in order to prevent re-injury. [] Progressing: [] Met: [] Not Met: [] Adjusted   2. Patient will have a decrease in pain to facilitate improvement in movement, function, and ADLs as indicated by Functional Deficits. [] Progressing: [] Met: [] Not Met: [] Adjusted    Long Term Goals: To be achieved in: 6 weeks  1. Patient will score 73 or higher on FOTO knee assessment indicating subjective improvement with functional activities. [] Progressing: [] Met: [] Not Met: [] Adjusted  2. Patient will demonstrate increased R knee AROM to 145 deg knee flexion in order to squat and kneel without any pain or limitations. [] Progressing: [] Met: [] Not Met: [] Adjusted  3.  Patient will demonstrate an increase in LE strength to 5/5 in all directions tested allowing pt to complete all ADL's in weight bearing with no restriction or pain. [] Progressing: [] Met: [] Not Met: [] Adjusted  4. Patient will negotiate a flight of stairs with normal sequencing, no assistance and no c/o pain. [] Progressing: [] Met: [] Not Met: [] Adjusted  5. Patient will complete DL squat with normal depth, no compensatory movement and no c/o pain in order to progress to return to running program independently. [] Progressing: [] Met: [] Not Met: [] Adjusted       Electronically signed by:  Ben Shoer PT    Note: If patient does not return for scheduled/ recommended follow up visits, this note will serve as a discharge from care along with most recent update on progress.

## 2022-10-04 ENCOUNTER — HOSPITAL ENCOUNTER (OUTPATIENT)
Dept: PHYSICAL THERAPY | Age: 42
Setting detail: THERAPIES SERIES
Discharge: HOME | End: 2022-10-04
Payer: COMMERCIAL

## 2022-10-04 PROCEDURE — 97112 NEUROMUSCULAR REEDUCATION: CPT

## 2022-10-04 PROCEDURE — 97110 THERAPEUTIC EXERCISES: CPT

## 2022-10-04 NOTE — FLOWSHEET NOTE
The [address] [place] [address] 200, [address] [place] [address], [address] [place] [address]  Phone: [phone]   Fax:     [phone]    Physical Therapy Daily Treatment Note  Date:  10/4/2022    Patient Name:  Eri Guerra    :  1980  MRN: [id number]  Restrictions/Precautions:    Medical/Treatment Diagnosis Information:  Diagnosis: M17.11 (ICD-10-CM) - Primary osteoarthritis of right knee  Treatment Diagnosis: M25.561 Right knee pain  Insurance/Certification information:  PT Insurance Information: [facility]  Physician Information:   Wendy Navarro MD  Has the plan of care been signed (Y/N):        []  Yes  [x]  No     Date of Patient follow up with Physician:       Is this a Progress Report:     []  Yes  [x]  No        If Yes:  Date Range for reporting period:  Beginning 22  Ending    Progress report will be due (10 Rx or 30 days whichever is less):        Recertification will be due (POC Duration  / 90 days whichever is less): 22         Visit # Insurance Allowable Auth Required   2  10/4 [facility]  20 visit [x]  Yes []  No        Functional Scale: FOTO knee 73  Date assessed:  22       Latex Allergy:  [x]NO      []YES  Preferred Language for Healthcare:   [x]English       []other:    Pain level:  0/10, 2/10 with movement such as squatting 10/4     SUBJECTIVE:  10/4 Pt reports feeling initial relief with taping but only lasted a day while it was on. Symptoms overall feel about the same. Pt reports having pain in knee with prone quad stretch.      OBJECTIVE: See eval  Observation:   Test measurements:      RESTRICTIONS/PRECAUTIONS:     Exercises/Interventions:   Exercise/Equipment Resistance/Repetitions Other comments   Stretching/AROM       Bike 5' Start 10/4 recumbent   Hamstring stretch 30\"hx3 R 10 supine with strep    ITB stretch 30\"hx3 R  supine with strap   Prone quad stretch 30\"hx3 R  towel roll with strap  10/4 reviewed form d/t symptoms reported when completing with HEP                Strengthening      SLR abd Red TB 3x10 R Start 10/4 side lying    SLR + 30\"hx3 R 9/27 long sitting                                  Neuromuscular re-ed      Quad set 10\"hx10 R 9/27 towel roll under knee, cues for VMO activation   LAQ with BS 10\"hx10 R ^10/4 seated 90<>45 deg    TKE Black TB 3x10 R Start 10/4    Mini squat 1x10 Start 10/4 cues for proper form, bilat UE support from table            Quantum machines       Leg press        Leg extension       Leg curl               Manual interventions        Hieu taping R patella to correct lateral tilt and glide  10/4                      Therapeutic Exercise and NMR EXR  [x] (14768) Provided verbal/tactile cueing for activities related to strengthening, flexibility, endurance, ROM for improvements in LE, proximal hip, and core control with self care, mobility, lifting, ambulation.  [] (35352) Provided verbal/tactile cueing for activities related to improving balance, coordination, kinesthetic sense, posture, motor skill, proprioception  to assist with LE, proximal hip, and core control in self care, mobility, lifting, ambulation and eccentric single leg control.      NMR and Therapeutic Activities:    [x] (45582 or 44676) Provided verbal/tactile cueing for activities related to improving balance, coordination, kinesthetic sense, posture, motor skill, proprioception and motor activation to allow for proper function of core, proximal hip and LE with self care and ADLs  [] (38786) Gait Re-education- Provided training and instruction to the patient for proper LE, core and proximal hip recruitment and positioning and eccentric body weight control with ambulation re-education including up and down stairs     Home Exercise Program:    [x] (76730) Reviewed/Progressed HEP activities related to strengthening, flexibility, endurance, ROM of core, proximal hip and LE for functional self-care, mobility, lifting and ambulation/stair navigation   [] (43265)Reviewed/Progressed HEP activities related to improving balance, coordination, kinesthetic sense, posture, motor skill, proprioception of core, proximal hip and LE for self care, mobility, lifting, and ambulation/stair navigation      Manual Treatments:  PROM / STM / Oscillations-Mobs:  G-I, II, III, IV (PA's, Inf., Post.)  [] (79463) Provided manual therapy to mobilize LE, proximal hip and/or LS spine soft tissue/joints for the purpose of modulating pain, promoting relaxation,  increasing ROM, reducing/eliminating soft tissue swelling/inflammation/restriction, improving soft tissue extensibility and allowing for proper ROM for normal function with self care, mobility, lifting and ambulation. Modalities:      Charges:  Timed Code Treatment Minutes: 44'   Total Treatment Minutes: 5:04-5:48  40'       [] EVAL (LOW) 20254 (typically 20 minutes face-to-face)  [] EVAL (MOD) 47308 (typically 30 minutes face-to-face)  [] EVAL (HIGH) 27509 (typically 45 minutes face-to-face)  [] RE-EVAL   [x] HU(60065) x  2   [] IONTO  [x] NMR (20040) x 1    [] VASO  [] Manual (24553) x      [] Other:  [] TA x      [] Mech Traction (14118)  [] ES(attended) (19773)      [] ES (un) (32255):     GOALS:   Patient stated goal: Run and exercise without pain     [] Progressing: [] Met: [] Not Met: [] Adjusted     Therapist goals for Patient:   Short Term Goals: To be achieved in: 3 weeks  1. Independent in HEP and progression per patient tolerance, in order to prevent re-injury. [] Progressing: [] Met: [] Not Met: [] Adjusted   2. Patient will have a decrease in pain to facilitate improvement in movement, function, and ADLs as indicated by Functional Deficits. [] Progressing: [] Met: [] Not Met: [] Adjusted     Long Term Goals: To be achieved in: 6 weeks  1.  Patient will score 73 or higher on FOTO knee assessment indicating subjective improvement with functional activities. [] Progressing: [] Met: [] Not Met: [] Adjusted  2. Patient will demonstrate increased R knee AROM to 145 deg knee flexion in order to squat and kneel without any pain or limitations. [] Progressing: [] Met: [] Not Met: [] Adjusted  3. Patient will demonstrate an increase in LE strength to 5/5 in all directions tested allowing pt to complete all ADL's in weight bearing with no restriction or pain. [] Progressing: [] Met: [] Not Met: [] Adjusted  4. Patient will negotiate a flight of stairs with normal sequencing, no assistance and no c/o pain. [] Progressing: [] Met: [] Not Met: [] Adjusted  5. Patient will complete DL squat with normal depth, no compensatory movement and no c/o pain in order to progress to return to running program independently. [] Progressing: [] Met: [] Not Met: [] Adjusted       Overall Progression Towards Functional goals/ Treatment Progress Update:  [] Patient is progressing as expected towards functional goals listed. [] Progression is slowed due to complexities/Impairments listed. [] Progression has been slowed due to co-morbidities. [x] Plan just implemented, too soon to assess goals progression <30days   [] Goals require adjustment due to lack of progress  [] Patient is not progressing as expected and requires additional follow up with physician  [] Other    Prognosis for POC: [x] Good [] Fair  [] Poor      Patient requires continued skilled intervention: [x] Yes  [] No    Treatment/Activity Tolerance:  [x] Patient able to complete treatment  [] Patient limited by fatigue  [] Patient limited by pain     [] Patient limited by other medical complications  [] Other: 10/4 Pt responded well to taping and reviewed form with prone quad stretch which she tolerated without increase in symptoms but tightness at knee reported. Pt challenged with progression of exercises but no adverse effects noted or reported.      Patient Education:             10/4 Reviewed difference between discomfort from stretching and muscle fatigue compared to knee pain. Updated HEP  9/27 Educated on objective findings and relationship to symptoms and functional limitations. Educated on POC in order to achieve goals. Reviewed precautions including avoiding lunges, squats and high impact activities until knee pain has subsided. Educated on use of ice for symptom control      HEP instruction:   Access Code: [email or web address]      PLAN: See eval  [x] Continue per plan of care [] Alter current plan (see comments above)  [] Plan of care initiated [] Hold pending MD visit [] Discharge      Electronically signed by:  Kya Jerry PT    Note: If patient does not return for scheduled/ recommended follow up visits, this note will serve as a discharge from care along with most recent update on progress.

## 2022-10-11 ENCOUNTER — HOSPITAL ENCOUNTER (OUTPATIENT)
Dept: PHYSICAL THERAPY | Age: 42
Setting detail: THERAPIES SERIES
Discharge: HOME | End: 2022-10-11
Payer: COMMERCIAL

## 2022-10-11 PROCEDURE — 97110 THERAPEUTIC EXERCISES: CPT

## 2022-10-11 PROCEDURE — 97112 NEUROMUSCULAR REEDUCATION: CPT

## 2022-10-11 NOTE — FLOWSHEET NOTE
The [address] [place] [address],[address], [address], [address] [place] [address]  Phone: [phone]   Fax:     [phone]    Physical Therapy Daily Treatment Note  Date:  10/11/2022    Patient Name:  Tia Donnelly    :  1980  MRN: [id number]  Restrictions/Precautions:    Medical/Treatment Diagnosis Information:  Diagnosis: M17.11 (ICD-10-CM) - Primary osteoarthritis of right knee  Treatment Diagnosis: M25.561 Right knee pain  Insurance/Certification information:  PT Insurance Information: [facility]  Physician Information:   Tash Gibson MD  Has the plan of care been signed (Y/N):        [x]  Yes  []  No     Date of Patient follow up with Physician:       Is this a Progress Report:     []  Yes  [x]  No        If Yes:  Date Range for reporting period:  Beginning 22  Ending    Progress report will be due (10 Rx or 30 days whichever is less):        Recertification will be due (POC Duration  / 90 days whichever is less): 22         Visit # Insurance Allowable Auth Required   3  10/11 [facility]  20 visit [x]  Yes []  No        Functional Scale: FOTO knee 73  Date assessed:  22       Latex Allergy:  [x]NO      []YES  Preferred Language for Healthcare:   [x]English       []other:    Pain level:  0/10 10/11      SUBJECTIVE:  10/11 Pt states R knee is feeling a little better and not noticing the same level of pain with stair negotiation. Pt has not been working out expect for walking and avoiding squatting.      OBJECTIVE: See eval  Observation:   Test measurements:      RESTRICTIONS/PRECAUTIONS:     Exercises/Interventions:   Exercise/Equipment Resistance/Repetitions Other comments   Stretching/AROM       Bike 5' Start 10/4 recumbent   Hamstring stretch 30\"hx3 R 10/4 supine with strap    ITB stretch 30\"hx3 R  supine with strap  10/11 reviewed form    Prone quad stretch 30\"hx3 R  towel roll with strap  10/4 reviewed form d/t symptoms reported when completing with HEP                Strengthening      Bridges  SL 2x10 R Start 10/11   SLR abd Green TB 3x10 R ^10/11 side lying    SLR + 30\"hx3 R 9/27 long sitting                                  Neuromuscular re-ed      Quad set 10\"h, 10\" rest x5' with biofeedback ^10/11 towel roll under knee   LAQ with BS 2# ankle weight 10\"hx10 R ^10/11 seated 90<>45 deg    TKE [place] TB 3x10 R ^10/11    Mini squat 10/11 progressed with wall sit    Wall sit 20\"hx5 Start 10/11 45 deg knee flexion   Quantum machines       Leg press   60# 3x10 DL concentric, SL eccentric on R Start 10/11 90<>20 deg    Leg extension       Leg curl               Manual interventions        Hieu taping R patella to correct lateral tilt and glide  10/11                     Therapeutic Exercise and NMR EXR  [x] (50948) Provided verbal/tactile cueing for activities related to strengthening, flexibility, endurance, ROM for improvements in LE, proximal hip, and core control with self care, mobility, lifting, ambulation.  [] (12204) Provided verbal/tactile cueing for activities related to improving balance, coordination, kinesthetic sense, posture, motor skill, proprioception  to assist with LE, proximal hip, and core control in self care, mobility, lifting, ambulation and eccentric single leg control.      NMR and Therapeutic Activities:    [x] (75135 or 58564) Provided verbal/tactile cueing for activities related to improving balance, coordination, kinesthetic sense, posture, motor skill, proprioception and motor activation to allow for proper function of core, proximal hip and LE with self care and ADLs  [] (20008) Gait Re-education- Provided training and instruction to the patient for proper LE, core and proximal hip recruitment and positioning and eccentric body weight control with ambulation re-education including up and down stairs     Home Exercise Program:    [x] (75297) Reviewed/Progressed HEP activities related to strengthening, flexibility, endurance, ROM of core, proximal hip and LE for functional self-care, mobility, lifting and ambulation/stair navigation   [] (54696)Reviewed/Progressed HEP activities related to improving balance, coordination, kinesthetic sense, posture, motor skill, proprioception of core, proximal hip and LE for self care, mobility, lifting, and ambulation/stair navigation      Manual Treatments:  PROM / STM / Oscillations-Mobs:  G-I, II, III, IV (PA's, Inf., Post.)  [] (00260) Provided manual therapy to mobilize LE, proximal hip and/or LS spine soft tissue/joints for the purpose of modulating pain, promoting relaxation,  increasing ROM, reducing/eliminating soft tissue swelling/inflammation/restriction, improving soft tissue extensibility and allowing for proper ROM for normal function with self care, mobility, lifting and ambulation. Modalities:      Charges:  Timed Code Treatment Minutes: 55'   Total Treatment Minutes: 1:16-2:07  46'       [] EVAL (LOW) 63378 (typically 20 minutes face-to-face)  [] EVAL (MOD) 57168 (typically 30 minutes face-to-face)  [] EVAL (HIGH) 24893 (typically 45 minutes face-to-face)  [] RE-EVAL   [x] CF(39985) x  2   [] IONTO  [x] NMR (42007) x 1    [] VASO  [] Manual (88412) x      [] Other:  [] TA x      [] Mech Traction (66367)  [] ES(attended) (96007)      [] ES (un) (55430):     GOALS:   Patient stated goal: Run and exercise without pain     [] Progressing: [] Met: [] Not Met: [] Adjusted     Therapist goals for Patient:   Short Term Goals: To be achieved in: 3 weeks  1. Independent in HEP and progression per patient tolerance, in order to prevent re-injury. [] Progressing: [] Met: [] Not Met: [] Adjusted   2. Patient will have a decrease in pain to facilitate improvement in movement, function, and ADLs as indicated by Functional Deficits. [] Progressing: [] Met: [] Not Met: [] Adjusted     Long Term Goals: To be achieved in: 6 weeks  1.  Patient will score 73 or higher on FOTO knee assessment indicating subjective improvement with functional activities. [] Progressing: [] Met: [] Not Met: [] Adjusted  2. Patient will demonstrate increased R knee AROM to 145 deg knee flexion in order to squat and kneel without any pain or limitations. [] Progressing: [] Met: [] Not Met: [] Adjusted  3. Patient will demonstrate an increase in LE strength to 5/5 in all directions tested allowing pt to complete all ADL's in weight bearing with no restriction or pain. [] Progressing: [] Met: [] Not Met: [] Adjusted  4. Patient will negotiate a flight of stairs with normal sequencing, no assistance and no c/o pain. [] Progressing: [] Met: [] Not Met: [] Adjusted  5. Patient will complete DL squat with normal depth, no compensatory movement and no c/o pain in order to progress to return to running program independently. [] Progressing: [] Met: [] Not Met: [] Adjusted       Overall Progression Towards Functional goals/ Treatment Progress Update:  [] Patient is progressing as expected towards functional goals listed. [] Progression is slowed due to complexities/Impairments listed. [] Progression has been slowed due to co-morbidities. [x] Plan just implemented, too soon to assess goals progression <30days   [] Goals require adjustment due to lack of progress  [] Patient is not progressing as expected and requires additional follow up with physician  [] Other    Prognosis for POC: [x] Good [] Fair  [] Poor      Patient requires continued skilled intervention: [x] Yes  [] No    Treatment/Activity Tolerance:  [x] Patient able to complete treatment  [] Patient limited by fatigue  [] Patient limited by pain     [] Patient limited by other medical complications  [] Other: 10/11 Introduced biofeedback during quad sets to work on [employer] activation which challenged pt but no increase in symptoms noted.  Able to progress intensity of treatment with appropriate fatigue but no adverse effects. Patient Education:             10/11 Updated HEP   10/4 Reviewed difference between discomfort from stretching and muscle fatigue compared to knee pain. Updated HEP  9/27 Educated on objective findings and relationship to symptoms and functional limitations. Educated on POC in order to achieve goals. Reviewed precautions including avoiding lunges, squats and high impact activities until knee pain has subsided. Educated on use of ice for symptom control      HEP instruction:   Access Code: [email or web address]      PLAN: See eval  [x] Continue per plan of care [] Alter current plan (see comments above)  [] Plan of care initiated [] Hold pending MD visit [] Discharge  10/11 Complete progress note NPV. Progress eccentric strengthening in weight bearing as tolerated. Electronically signed by:  Neo Kendall PT    Note: If patient does not return for scheduled/ recommended follow up visits, this note will serve as a discharge from care along with most recent update on progress.

## 2022-10-18 ENCOUNTER — HOSPITAL ENCOUNTER (OUTPATIENT)
Dept: PHYSICAL THERAPY | Age: 42
Setting detail: THERAPIES SERIES
Discharge: HOME | End: 2022-10-18
Payer: COMMERCIAL

## 2022-10-18 PROCEDURE — 97112 NEUROMUSCULAR REEDUCATION: CPT

## 2022-10-18 PROCEDURE — 97110 THERAPEUTIC EXERCISES: CPT

## 2022-10-18 NOTE — PROGRESS NOTES
The [facility] and [facility], [address], [address] [place] [address]  Phone: [phone]   Fax:     [phone]    Physical Therapy Daily Treatment Note  Date:  10/18/2022    Patient Name:  Marilia Boothe    :  1980  MRN: [id number]  Restrictions/Precautions:    Medical/Treatment Diagnosis Information:  Diagnosis: M17.11 (ICD-10-CM) - Primary osteoarthritis of right knee  Treatment Diagnosis: M25.561 Right knee pain  Insurance/Certification information:  PT Insurance Information: [facility]  Physician Information:   Yasmani Craig MD  Has the plan of care been signed (Y/N):        [x]  Yes  []  No     Date of Patient follow up with Physician:       Is this a Progress Report:     [x]  Yes  []  No        If Yes:  Date Range for reporting period:  Beginning 22  Ending 10/18/22    Progress report will be due (10 Rx or 30 days whichever is less): 22       Recertification will be due (POC Duration  / 90 days whichever is less): 22         Visit # Insurance Allowable Auth Required   4  10/18 [facility]  16 visits  - [x]  Yes []  No        Functional Scale:   FOTO knee 63  Date assessed: 10/18/22  FOTO knee 63  Date assessed:  22       Latex Allergy:  [x]NO      []YES  Preferred Language for Healthcare:   [x]English       []other:    Pain level:  0/10 @ rest, 2-3/10 with squatting and stairs 10/18      SUBJECTIVE:  10/18 Pt states she continues to have pain with squatting and stairs. Feels like things are not getting better. Missed exercises over the weekend and was more active on her feet. OBJECTIVE: Updated below on 10/18   Observation:    Functional Mobility/Transfers: 10/18  SLS: eyes open, WNL with no postural sway or c/o pain   DL squat: Depth 50% of normal with proper form, pt stopped d/t apprehension with movement and crepitus noted.    Test measurements:      Palpation: No tenderness activities related to improving balance, coordination, kinesthetic sense, posture, motor skill, proprioception  to assist with LE, proximal hip, and core control in self care, mobility, lifting, ambulation and eccentric single leg control. NMR and Therapeutic Activities:    [x] (35269 or 59840) Provided verbal/tactile cueing for activities related to improving balance, coordination, kinesthetic sense, posture, motor skill, proprioception and motor activation to allow for proper function of core, proximal hip and LE with self care and ADLs  [] (61299) Gait Re-education- Provided training and instruction to the patient for proper LE, core and proximal hip recruitment and positioning and eccentric body weight control with ambulation re-education including up and down stairs     Home Exercise Program:    [x] (70898) Reviewed/Progressed HEP activities related to strengthening, flexibility, endurance, ROM of core, proximal hip and LE for functional self-care, mobility, lifting and ambulation/stair navigation   [] (96367)Reviewed/Progressed HEP activities related to improving balance, coordination, kinesthetic sense, posture, motor skill, proprioception of core, proximal hip and LE for self care, mobility, lifting, and ambulation/stair navigation      Manual Treatments:  PROM / STM / Oscillations-Mobs:  G-I, II, III, IV (PA's, Inf., Post.)  [] (17343) Provided manual therapy to mobilize LE, proximal hip and/or LS spine soft tissue/joints for the purpose of modulating pain, promoting relaxation,  increasing ROM, reducing/eliminating soft tissue swelling/inflammation/restriction, improving soft tissue extensibility and allowing for proper ROM for normal function with self care, mobility, lifting and ambulation.      Modalities:      Charges:  Timed Code Treatment Minutes: 39'   Total Treatment Minutes: 11:17-2:07  50'       [] EVAL (LOW) [id number] (typically 20 minutes face-to-face)  [] EVAL (MOD) 72755 (typically 30 minutes face-to-face)  [] EVAL (HIGH) 71220 (typically 45 minutes face-to-face)  [] RE-EVAL   [x] OB(83243) x  2   [] IONTO  [x] NMR (74244) x 1    [] VASO  [] Manual (40945) x      [] Other:  [] TA x      [] Mech Traction (82072)  [] ES(attended) (37537)      [] ES (un) (84568):     GOALS:   Patient stated goal: Run and exercise without pain     [x] Progressing: [] Met: [] Not Met: [] Adjusted     Therapist goals for Patient:   Short Term Goals: To be achieved in: 3 weeks  1. Independent in HEP and progression per patient tolerance, in order to prevent re-injury. [] Progressing: [x] Met: [] Not Met: [] Adjusted   2. Patient will have a decrease in pain to facilitate improvement in movement, function, and ADLs as indicated by Functional Deficits. [] Progressing: [x] Met: [] Not Met: [] Adjusted     Long Term Goals: To be achieved in: 6 weeks  1. Patient will score 73 or higher on FOTO knee assessment indicating subjective improvement with functional activities. [x] Progressing: [] Met: [] Not Met: [] Adjusted  2. Patient will demonstrate increased R knee AROM to 145 deg knee flexion in order to squat and kneel without any pain or limitations. [] Progressing: [x] Met: [] Not Met: [] Adjusted Achieved ROM goals but continues to have apprehension and limitations with squatting that should improve with PT  3. Patient will demonstrate an increase in LE strength to 5/5 in all directions tested allowing pt to complete all ADL's in weight bearing with no restriction or pain. [x] Progressing: [] Met: [] Not Met: [] Adjusted  4. Patient will negotiate a flight of stairs with normal sequencing, no assistance and no c/o pain. [x] Progressing: [] Met: [] Not Met: [] Adjusted  5. Patient will complete DL squat with normal depth, no compensatory movement and no c/o pain in order to progress to return to running program independently.     [x] Progressing: [] Met: [] Not Met: [] Adjusted       Overall Progression Towards Functional goals/ Treatment Progress Update:  [x] Patient is progressing as expected towards functional goals listed with increase ROM and strength. She continues to have strength deficits causing continued pain and limitations with squatting and stairs that should improve with more PT.     [] Progression is slowed due to complexities/Impairments listed. [] Progression has been slowed due to co-morbidities. [] Plan just implemented, too soon to assess goals progression <30days   [] Goals require adjustment due to lack of progress  [] Patient is not progressing as expected and requires additional follow up with physician  [] Other    Prognosis for POC: [x] Good [] Fair  [] Poor      Patient requires continued skilled intervention: [x] Yes  [] No    Treatment/Activity Tolerance:  [x] Patient able to complete treatment  [] Patient limited by fatigue  [] Patient limited by pain     [] Patient limited by other medical complications  [] Other: 10/18 Attempted to add exercises in weight bearing but withheld d/t continued knee pain. D/t symptoms adjusted exercises in non weight bearing which she responded well to with appropriate fatigue and no increase in pain. Patient Education:             10/18 Withheld wall sits and LAQ from HEP in order to decrease stress on patellofemoral joint and increased intensity of exercises in non weight bearing   10/11 Updated HEP   10/4 Reviewed difference between discomfort from stretching and muscle fatigue compared to knee pain. Updated HEP  9/27 Educated on objective findings and relationship to symptoms and functional limitations. Educated on POC in order to achieve goals. Reviewed precautions including avoiding lunges, squats and high impact activities until knee pain has subsided.  Educated on use of ice for symptom control      HEP instruction:   Access Code: [email or web address]      PLAN: See eval  [x] Continue per plan of care [] Alter current plan (see comments above)  [] Plan of care initiated [] Hold pending MD visit [] Discharge  10/18 Based on progress and continued objective limitations and subjective complaints she would benefit from further PT 1x a week for 3 more weeks. Electronically signed by:  Bernadette Dupont PT    Note: If patient does not return for scheduled/ recommended follow up visits, this note will serve as a discharge from care along with most recent update on progress.

## 2022-10-25 ENCOUNTER — HOSPITAL ENCOUNTER (OUTPATIENT)
Dept: PHYSICAL THERAPY | Age: 42
Setting detail: THERAPIES SERIES
Discharge: HOME | End: 2022-10-25
Payer: COMMERCIAL

## 2022-10-25 PROCEDURE — 97110 THERAPEUTIC EXERCISES: CPT

## 2022-10-25 PROCEDURE — 97112 NEUROMUSCULAR REEDUCATION: CPT

## 2022-10-25 NOTE — FLOWSHEET NOTE
The [facility] and [address] [place] [address], [address] [place] [address] [address] [facility], [address] [place] [address]  Phone: [phone]   Fax:     [phone]    Physical Therapy Daily Treatment Note  Date:  10/25/2022    Patient Name:  Marilin Morrison    :  1980  MRN: [id number]  Restrictions/Precautions:    Medical/Treatment Diagnosis Information:  Diagnosis: M17.11 (ICD-10-CM) - Primary osteoarthritis of right knee  Treatment Diagnosis: M25.561 Right knee pain  Insurance/Certification information:  PT Insurance Information: [facility]  Physician Information:   Shira Marcelo MD  Has the plan of care been signed (Y/N):        [x]  Yes  []  No     Date of Patient follow up with Physician:       Is this a Progress Report:     []  Yes  [x]  No        If Yes:  Date Range for reporting period:  Beginning 22  Ending 10/18/22    Progress report will be due (10 Rx or 30 days whichever is less):        Recertification will be due (POC Duration  / 90 days whichever is less): 22         Visit # Insurance Allowable Auth Required   5  10/25 [facility]  16 visits  - [x]  Yes []  No        Functional Scale:   FOTO knee 63  Date assessed: 10/18/22  FOTO knee 63  Date assessed:  22       Latex Allergy:  [x]NO      []YES  Preferred Language for Healthcare:   [x]English       []other:    Pain level:  0/10 @ rest, 2-3/10 with squatting and stairs 10/25      SUBJECTIVE:  10/25: Pt states symptoms are about the same and continues to have pain with squatting and stairs. Pt reports she fell a couple weeks ago walking out of her kids school and landed on the front of her body but not directly on her knees. She forgot to mention this at prior visits. Does not feel as stable with activities such as walking down hill.      OBJECTIVE: Updated below on 10/18   Observation:    Functional Mobility/Transfers: 10/18  SLS: eyes open, WNL with no postural sway or c/o pain   DL squat: Depth 50% of normal with proper form, pt stopped d/t apprehension with movement and crepitus noted.    Test measurements:      Palpation: No tenderness 10/18  Flexibility L R 10/18 Comment   Hamstring 168 160 Measured 90/90   Gastroc 103 107     Jeremias WNL WNL      Ely's WNL Mild restiction                           ROM PROM AROM Comment     L R L R 10/18     Flexion     143 145     Extension     +8 +6       Strength L R 10/18 Comment   Quad 5 5     Hamstring 5 5     Hip  flexion 5 4+     Hip abd 5 4+     Hip add 4+ 5         RESTRICTIONS/PRECAUTIONS:     Exercises/Interventions:   Exercise/Equipment Resistance/Repetitions Other comments   Stretching/AROM       Bike 5' Start 10/4 recumbent   Hamstring stretch 30\"hx3 R 10/4 supine with strap    ITB stretch 30\"hx3 R 9/27 supine with strap  10/11 reviewed form    Prone quad stretch 30\"hx3 R 9/27 towel roll with strap  10/4 reviewed form d/t symptoms reported when completing with HEP                Strengthening      Bridges  SL 3x10 R ^10/18    SLR flexion Blue TB 3x10 R ^10//25   SLR abd 10/25 progressed with side stepping    Side stepping Blue TB @ knees 2 laps Start 10/25   SLR + 30\"hx3 R 9/27 long sitting                                  Neuromuscular re-ed      Quad set 10/18 progressed with exercises in weight bearing    LAQ with BS ^10/11 seated 90<>45 deg    TKE CC 6 plates 8J68 R ^82/52    SLB 30\"hx3 R Start 10/25 airex    Reverse lunge with slider 2x10 R Start 10/25 L UE support on table    Mini squat 10/11 progressed with wall sit    Wall sit 10/25 progressed with reverse lunge   Lateral step ups ^10/18    Quantum machines       Leg press   70# 3x10 DL concentric, SL eccentric on R ^10/25 90<>20 deg    Leg extension       Leg curl               Manual interventions        Hieu taping R patella to correct lateral tilt and glide  10/25 continued with taping d/t continued symptoms                      Therapeutic Exercise and NMR EXR  [x] (26935) Provided verbal/tactile cueing for activities related to strengthening, flexibility, endurance, ROM for improvements in LE, proximal hip, and core control with self care, mobility, lifting, ambulation.  [] (02435) Provided verbal/tactile cueing for activities related to improving balance, coordination, kinesthetic sense, posture, motor skill, proprioception  to assist with LE, proximal hip, and core control in self care, mobility, lifting, ambulation and eccentric single leg control.      NMR and Therapeutic Activities:    [x] (25369 or 10806) Provided verbal/tactile cueing for activities related to improving balance, coordination, kinesthetic sense, posture, motor skill, proprioception and motor activation to allow for proper function of core, proximal hip and LE with self care and ADLs  [] (79726) Gait Re-education- Provided training and instruction to the patient for proper LE, core and proximal hip recruitment and positioning and eccentric body weight control with ambulation re-education including up and down stairs     Home Exercise Program:    [x] (19863) Reviewed/Progressed HEP activities related to strengthening, flexibility, endurance, ROM of core, proximal hip and LE for functional self-care, mobility, lifting and ambulation/stair navigation   [] (19831)Reviewed/Progressed HEP activities related to improving balance, coordination, kinesthetic sense, posture, motor skill, proprioception of core, proximal hip and LE for self care, mobility, lifting, and ambulation/stair navigation      Manual Treatments:  PROM / STM / Oscillations-Mobs:  G-I, II, III, IV (PA's, Inf., Post.)  [] (23938) Provided manual therapy to mobilize LE, proximal hip and/or LS spine soft tissue/joints for the purpose of modulating pain, promoting relaxation,  increasing ROM, reducing/eliminating soft tissue swelling/inflammation/restriction, improving soft tissue extensibility and allowing for proper ROM for normal function with self care, mobility, lifting and ambulation. Modalities:      Charges:  Timed Code Treatment Minutes: 41   Total Treatment Minutes: 1:17-2:03  46'       [] EVAL (LOW) 62573 (typically 20 minutes face-to-face)  [] EVAL (MOD) 49170 (typically 30 minutes face-to-face)  [] EVAL (HIGH) 71718 (typically 45 minutes face-to-face)  [] RE-EVAL   [x] WA(27117) x  2   [] IONTO  [x] NMR (52902) x 1    [] VASO  [] Manual (52131) x      [] Other:  [] TA x      [] Mech Traction (82450)  [] ES(attended) (85332)      [] ES (un) (97843):     GOALS:   Patient stated goal: Run and exercise without pain     [x] Progressing: [] Met: [] Not Met: [] Adjusted     Therapist goals for Patient:   Short Term Goals: To be achieved in: 3 weeks  1. Independent in HEP and progression per patient tolerance, in order to prevent re-injury. [] Progressing: [x] Met: [] Not Met: [] Adjusted   2. Patient will have a decrease in pain to facilitate improvement in movement, function, and ADLs as indicated by Functional Deficits. [] Progressing: [x] Met: [] Not Met: [] Adjusted     Long Term Goals: To be achieved in: 6 weeks  1. Patient will score 73 or higher on FOTO knee assessment indicating subjective improvement with functional activities. [x] Progressing: [] Met: [] Not Met: [] Adjusted  2. Patient will demonstrate increased R knee AROM to 145 deg knee flexion in order to squat and kneel without any pain or limitations. [] Progressing: [x] Met: [] Not Met: [] Adjusted Achieved ROM goals but continues to have apprehension and limitations with squatting that should improve with PT  3. Patient will demonstrate an increase in LE strength to 5/5 in all directions tested allowing pt to complete all ADL's in weight bearing with no restriction or pain. [x] Progressing: [] Met: [] Not Met: [] Adjusted  4. Patient will negotiate a flight of stairs with normal sequencing, no assistance and no c/o pain.    [x] Progressing: [] Met: [] Not Met: [] Adjusted  5. Patient will complete DL squat with normal depth, no compensatory movement and no c/o pain in order to progress to return to running program independently. [x] Progressing: [] Met: [] Not Met: [] Adjusted       Overall Progression Towards Functional goals/ Treatment Progress Update:  [x] Patient is progressing as expected towards functional goals listed with increase ROM and strength. She continues to have strength deficits causing continued pain and limitations with squatting and stairs that should improve with more PT.     [] Progression is slowed due to complexities/Impairments listed. [] Progression has been slowed due to co-morbidities. [] Plan just implemented, too soon to assess goals progression <30days   [] Goals require adjustment due to lack of progress  [] Patient is not progressing as expected and requires additional follow up with physician  [] Other    Prognosis for POC: [x] Good [] Fair  [] Poor      Patient requires continued skilled intervention: [x] Yes  [] No    Treatment/Activity Tolerance:  [x] Patient able to complete treatment  [] Patient limited by fatigue  [] Patient limited by pain     [] Patient limited by other medical complications  [] Other: 10/25 progressed strengthening weight bearing as well as dynamic balance which patient responded well to with appropriate fatigue. Cues for proper form with reverse lunge and she responded well to cues with no increase in symptoms. Tolerated treatment with no adverse effects. Patient Education:             10/25 Updated HEP  10/18 Withheld wall sits and LAQ from HEP in order to decrease stress on patellofemoral joint and increased intensity of exercises in non weight bearing   10/11 Updated HEP   10/4 Reviewed difference between discomfort from stretching and muscle fatigue compared to knee pain. Updated HEP  9/27 Educated on objective findings and relationship to symptoms and functional limitations.  Educated on [address] [facility] [address] in order to achieve goals. Reviewed precautions including avoiding lunges, squats and high impact activities until knee pain has subsided. Educated on use of ice for symptom control      HEP instruction:   Access Code: [email or web address]      PLAN: See eval  [x] Continue per plan of care [] Alter current plan (see comments above)  [] Plan of care initiated [] Hold pending MD visit [] Discharge  10/25 Continue to push strengthening in weight bearing and dynamic balance as tolerated. 10/18 Based on progress and continued objective limitations and subjective complaints she would benefit from further PT 1x a week for 3 more weeks. Electronically signed by:  Neo Kendall PT    Note: If patient does not return for scheduled/ recommended follow up visits, this note will serve as a discharge from care along with most recent update on progress.

## 2022-11-01 ENCOUNTER — HOSPITAL ENCOUNTER (OUTPATIENT)
Dept: PHYSICAL THERAPY | Age: 42
Setting detail: THERAPIES SERIES
Discharge: HOME | End: 2022-11-01
Payer: COMMERCIAL

## 2022-11-01 PROCEDURE — 97112 NEUROMUSCULAR REEDUCATION: CPT

## 2022-11-01 PROCEDURE — 97110 THERAPEUTIC EXERCISES: CPT

## 2022-11-01 NOTE — FLOWSHEET NOTE
The [facility] and [address] [place] [address], [address] [place] [address] [facility], [address] [place] [address]  Phone: [phone]   Fax:     [phone]    Physical Therapy Daily Treatment Note  Date:  2022    Patient Name:  Tia Donnelly    :  1980  MRN: [id number]  Restrictions/Precautions:    Medical/Treatment Diagnosis Information:  Diagnosis: M17.11 (ICD-10-CM) - Primary osteoarthritis of right knee  Treatment Diagnosis: M25.561 Right knee pain  Insurance/Certification information:  PT Insurance Information: [facility]  Physician Information:   Tash Gibson MD  Has the plan of care been signed (Y/N):        [x]  Yes  []  No     Date of Patient follow up with Physician:       Is this a Progress Report:     []  Yes  [x]  No        If Yes:  Date Range for reporting period:  Beginning 22  Ending 10/18/22    Progress report will be due (10 Rx or 30 days whichever is less): 61       Recertification will be due (POC Duration  / 90 days whichever is less): 22         Visit # Insurance Allowable Auth Required   6   [facility]  16 visits  - [x]  Yes []  No        Functional Scale:   FOTO knee 63  Date assessed: 10/18/22  FOTO knee 63  Date assessed:  22       Latex Allergy:  [x]NO      []YES  Preferred Language for Healthcare:   [x]English       []other:    Pain level:  0/10      SUBJECTIVE:   Pt feels like she is getting some where with progress and not having as much pain with stairs. Stairs are still not perfect but no longer have sharp pain. Pt did not wear tape that long after last visit d/t being itchy around ski. OBJECTIVE: Updated below on 10/18   Observation:    Functional Mobility/Transfers: 10/18  SLS: eyes open, WNL with no postural sway or c/o pain   DL squat: Depth 50% of normal with proper form, pt stopped d/t apprehension with movement and crepitus noted.    Test measurements:      Palpation: No tenderness 10/18  Flexibility L R 10/18 Comment   Hamstring 168 160 Measured 90/90   Gastroc 103 107     Jeremias WNL WNL      Ely's WNL Mild restiction                           ROM PROM AROM Comment     L R L R 10/18     Flexion     143 145     Extension     +8 +6       Strength L R 10/18 Comment   Quad 5 5     Hamstring 5 5     Hip  flexion 5 4+     Hip abd 5 4+     Hip add 4+ 5         RESTRICTIONS/PRECAUTIONS:     Exercises/Interventions:   Exercise/Equipment Resistance/Repetitions Other comments   Stretching/AROM       Bike 5' Start 10/4 recumbent   Hamstring stretch 30\"hx3 R 10/4 supine with strap    ITB stretch 30\"hx3 R 9/27 supine with strap  10/11 reviewed form    Prone quad stretch 30\"hx3 R 9/27 towel roll with strap  10/4 reviewed form d/t symptoms reported when completing with HEP                Strengthening      Bridges  SL 3x10 R ^10/18    SLR flexion Blue TB 3x10 R ^10//25   SLR abd 10/25 progressed with side stepping    Side stepping Blue TB @ knees 3 laps ^11/1   Band walks forward/backward Blue TB @ knees 3 laps Start 11/1   SLR + 30\"hx3 R 9/27 long sitting                                  Neuromuscular re-ed      Quad set 10/18 progressed with exercises in weight bearing    LAQ with BS ^10/11 seated 90<>45 deg    TKE CC 7 plates 4K88 R ^03/5   SLB 30\"hx3 R ^11/1 Biodex postural stability L12   Reverse lunge with slider 2x10 R 11/1 R UE support at table, cues to maintain proper form and stay in pain free range   Mini squat 10/11 progressed with wall sit    Wall sit 10/25 progressed with reverse lunge   Lateral step ups ^10/18    Quantum machines       Leg press   80# 3x10 DL concentric, SL eccentric on R ^11/1 90<>20 deg, cues to not flex knee past 90 deg   Leg extension       Leg curl               Manual interventions        Hagen taping Withheld d/t itchiness after last visit and improvement with symptoms 11/1                     Therapeutic Exercise and NMR EXR  [x] (36428) Provided verbal/tactile cueing for activities related to strengthening, flexibility, endurance, ROM for improvements in LE, proximal hip, and core control with self care, mobility, lifting, ambulation.  [] (53246) Provided verbal/tactile cueing for activities related to improving balance, coordination, kinesthetic sense, posture, motor skill, proprioception  to assist with LE, proximal hip, and core control in self care, mobility, lifting, ambulation and eccentric single leg control.      NMR and Therapeutic Activities:    [x] (57387 or 52891) Provided verbal/tactile cueing for activities related to improving balance, coordination, kinesthetic sense, posture, motor skill, proprioception and motor activation to allow for proper function of core, proximal hip and LE with self care and ADLs  [] (80483) Gait Re-education- Provided training and instruction to the patient for proper LE, core and proximal hip recruitment and positioning and eccentric body weight control with ambulation re-education including up and down stairs     Home Exercise Program:    [x] (37310) Reviewed/Progressed HEP activities related to strengthening, flexibility, endurance, ROM of core, proximal hip and LE for functional self-care, mobility, lifting and ambulation/stair navigation   [] (57697)Reviewed/Progressed HEP activities related to improving balance, coordination, kinesthetic sense, posture, motor skill, proprioception of core, proximal hip and LE for self care, mobility, lifting, and ambulation/stair navigation      Manual Treatments:  PROM / STM / Oscillations-Mobs:  G-I, II, III, IV (PA's, Inf., Post.)  [] (02118) Provided manual therapy to mobilize LE, proximal hip and/or LS spine soft tissue/joints for the purpose of modulating pain, promoting relaxation,  increasing ROM, reducing/eliminating soft tissue swelling/inflammation/restriction, improving soft tissue extensibility and allowing for proper ROM for normal function with self care, mobility, lifting and ambulation. Modalities:      Charges:  Timed Code Treatment Minutes: 39'   Total Treatment Minutes: 1:15-2:01  55'       [] EVAL (LOW) 58274 (typically 20 minutes face-to-face)  [] EVAL (MOD) 92315 (typically 30 minutes face-to-face)  [] EVAL (HIGH) 01074 (typically 45 minutes face-to-face)  [] RE-EVAL   [x] SY(77106) x  2   [] IONTO  [x] NMR (39850) x 1    [] VASO  [] Manual (32201) x      [] Other:  [] TA x      [] Mech Traction (42696)  [] ES(attended) (50851)      [] ES (un) (65244):     GOALS:   Patient stated goal: Run and exercise without pain     [x] Progressing: [] Met: [] Not Met: [] Adjusted     Therapist goals for Patient:   Short Term Goals: To be achieved in: 3 weeks  1. Independent in HEP and progression per patient tolerance, in order to prevent re-injury. [] Progressing: [x] Met: [] Not Met: [] Adjusted   2. Patient will have a decrease in pain to facilitate improvement in movement, function, and ADLs as indicated by Functional Deficits. [] Progressing: [x] Met: [] Not Met: [] Adjusted     Long Term Goals: To be achieved in: 6 weeks  1. Patient will score 73 or higher on FOTO knee assessment indicating subjective improvement with functional activities. [x] Progressing: [] Met: [] Not Met: [] Adjusted  2. Patient will demonstrate increased R knee AROM to 145 deg knee flexion in order to squat and kneel without any pain or limitations. [] Progressing: [x] Met: [] Not Met: [] Adjusted Achieved ROM goals but continues to have apprehension and limitations with squatting that should improve with PT  3. Patient will demonstrate an increase in LE strength to 5/5 in all directions tested allowing pt to complete all ADL's in weight bearing with no restriction or pain. [x] Progressing: [] Met: [] Not Met: [] Adjusted  4. Patient will negotiate a flight of stairs with normal sequencing, no assistance and no c/o pain. [x] Progressing: [] Met: [] Not Met: [] Adjusted  5.  Patient will complete DL squat with normal depth, no compensatory movement and no c/o pain in order to progress to return to running program independently. [x] Progressing: [] Met: [] Not Met: [] Adjusted       Overall Progression Towards Functional goals/ Treatment Progress Update:  [x] Patient is progressing as expected towards functional goals listed with increase ROM and strength. She continues to have strength deficits causing continued pain and limitations with squatting and stairs that should improve with more PT.     [] Progression is slowed due to complexities/Impairments listed. [] Progression has been slowed due to co-morbidities. [] Plan just implemented, too soon to assess goals progression <30days   [] Goals require adjustment due to lack of progress  [] Patient is not progressing as expected and requires additional follow up with physician  [] Other    Prognosis for POC: [x] Good [] Fair  [] Poor      Patient requires continued skilled intervention: [x] Yes  [] No    Treatment/Activity Tolerance:  [x] Patient able to complete treatment  [] Patient limited by fatigue  [] Patient limited by pain     [] Patient limited by other medical complications  [] Other: 11/1 pt experienced some discomfort R knee during last set of eccentric leg press. Pt had increased knee flexion ROM past 90 and once corrected form she was able to complete rest of set without pain. Pt challenged with reverse lunge requiring multiple verbal and tactile cues for proper form. D/t multiple cues required and concern of pt doing incorrectly on her own withheld lunges from HEP. Pt needs to continue to work on eccentric strength to improve stability at R knee with functional activities in weight bearing. Tolerated treatment with appropriate fatigue and no adverse effects.      Patient Education:             11/1 Updated HEP based tolerance to treatment   10/25 Updated HEP  10/18 Withheld wall sits and LAQ from HEP in order to decrease stress on patellofemoral joint and increased intensity of exercises in non weight bearing   10/11 Updated HEP   10/4 Reviewed difference between discomfort from stretching and muscle fatigue compared to knee pain. Updated HEP  9/27 Educated on objective findings and relationship to symptoms and functional limitations. Educated on POC in order to achieve goals. Reviewed precautions including avoiding lunges, squats and high impact activities until knee pain has subsided. Educated on use of ice for symptom control      HEP instruction:   Access Code: [email or web address]      PLAN: See eval  [x] Continue per plan of care [] Alter current plan (see comments above)  [] Plan of care initiated [] Hold pending MD visit [] Discharge  11/1 Complete progress note NPV    Electronically signed by:  Montse Harris PT    Note: If patient does not return for scheduled/ recommended follow up visits, this note will serve as a discharge from care along with most recent update on progress.

## 2022-11-08 ENCOUNTER — HOSPITAL ENCOUNTER (OUTPATIENT)
Dept: PHYSICAL THERAPY | Age: 42
Setting detail: THERAPIES SERIES
Discharge: HOME | End: 2022-11-08
Payer: COMMERCIAL

## 2022-11-08 PROCEDURE — 97530 THERAPEUTIC ACTIVITIES: CPT

## 2022-11-08 PROCEDURE — 97110 THERAPEUTIC EXERCISES: CPT

## 2022-11-08 PROCEDURE — 97112 NEUROMUSCULAR REEDUCATION: CPT

## 2022-11-08 NOTE — PLAN OF CARE
The [address] [place] [address],[address], [address], [address] [place] [address]  Phone: [phone]   Fax:     [phone]   Physical Therapy Re-Certification Plan of Care    Dear Dr. Laura Carreno,    We had the pleasure of treating the following patient for physical therapy services at [address] [place] [address]. A summary of our findings can be found in the updated assessment below. This includes our plan of care. If you have any questions or concerns regarding these findings, please do not hesitate to contact me at the office phone number checked above. Thank you for the referral.     Physician Signature:________________________________Date:__________________  By signing above (or electronic signature), therapists plan is approved by physician      Overall Response to Treatment:   [x]Patient is responding well to treatment and improvement is noted with regards to increase in motion, flexibility and strength allowing for decrease in pain and increase in function. She continues to have mild limitations with squatting related activities that should improve with continuing to work on strengthening independently.      []Patient should continue to improve in reasonable time if they continue HEP   []Patient has plateaued and is no longer responding to skilled PT intervention    []Patient is getting worse and would benefit from return to referring MD   []Patient unable to adhere to initial POC   []Other          Physical Therapy Daily Treatment Note  Date:  2022    Patient Name:  Mahesh Rose    :  1980  MRN: [id number]  Restrictions/Precautions:    Medical/Treatment Diagnosis Information:  Diagnosis: M17.11 (ICD-10-CM) - Primary osteoarthritis of right knee  Treatment Diagnosis: M25.561 Right knee pain  Insurance/Certification information:  PT Insurance Information: [facility]  Physician Information:   Shaun Barakat MD  Has the plan of care been signed (Y/N):        [x]  Yes  []  No     Date of Patient follow up with Physician:       Is this a Progress Report:     [x]  Yes  []  No        If Yes:  Date Range for reporting period:  Beginning 9/27/22  Ending 11/8/22    Progress report will be due (10 Rx or 30 days whichever is less): 02/7      Recertification will be due (POC Duration  / 90 days whichever is less): 12/30        Visit # Insurance Allowable Auth Required   7  11/8 [facility]  16 visits  9/29-12/30 [x]  Yes []  No        Functional Scale:   FOTO knee 63  Date assessed: 11/8/22   FOTO knee 63  Date assessed: 10/18/22  FOTO knee 63  Date assessed:  9/27/22       Latex Allergy:  [x]NO      []YES  Preferred Language for Healthcare:   [x]English       []other:    Pain level:  0/10 11/8    SUBJECTIVE:  11/8 Pt reports about the same for the most part and might be slightly better. Currently not having any pain. Still has some pain with squatting but stairs has improved. Attempted to work out yesterday with home work out video and modified acivities wihtout increase in symptoms/     OBJECTIVE: Updated below on 11/8   Observation:   Gait: Independent, WNL    Functional Mobility/Transfers: 11/8  DL squat: Depth 50% of normal with proper form, pt stopped d/t apprehension with movement and crepitus noted.  No pain reported   Test measurements:      Palpation: No tenderness 11/8  Flexibility L R 11/8 Comment   Hamstring 168 170 Measured 90/90   Gastroc 103 110     Jeremias WNL WNL      Ely's WNL WNL                           ROM PROM AROM Comment     L R L R 11/8     Flexion     143 145     Extension     +8 +6       Strength L R 11/8 Comment   Quad 5 5     Hamstring 5 5     Hip  flexion 5 5     Hip abd 5 5     Hip add 4+ 5         RESTRICTIONS/PRECAUTIONS:     Exercises/Interventions:   Exercise/Equipment Resistance/Repetitions Other comments   Stretching/AROM       Bike 5' Start 10/4 recumbent   Hamstring stretch Cont with HEP  11/8   ITB stretch Cont with HEP 11/8   Prone quad stretch Cont with HEP 11/8                Strengthening      Bridges  SL 3x10 R ^11/8 opposite LE straight and level with R thigh    SLR flexion Blue TB 3x10 R ^10//25   SLR abd 10/25 progressed with side stepping    Side stepping Black TB @ knees 3 laps ^11/8   Band walks forward/backward Black TB @ knees 3 laps ^11/8   SLR + 30\"hx3 R 9/27 long sitting                                  Neuromuscular re-ed      Quad set 10/18 progressed with exercises in weight bearing    LAQ with BS ^10/11 seated 90<>45 deg    TKE ^11/1   SLS with hip abd SLS on R with L hip abd 3x10 ^11/8    Reverse lunge with slider Progressed with squats 11/8   Mini squat 3x10 DL 11/8 completed 0<>45 deg knee flexion, bilat UE support on ledge. Wall sit 10/25 progressed with reverse lunge   Lateral step ups ^10/18    Quantum machines       Leg press  ^11/1 90<>20 deg, cues to not flex knee past 90 deg   Leg extension       Leg curl               Manual interventions        Hieu taping 11/1                     Therapeutic Exercise and NMR EXR  [x] (58550) Provided verbal/tactile cueing for activities related to strengthening, flexibility, endurance, ROM for improvements in LE, proximal hip, and core control with self care, mobility, lifting, ambulation.  [] (51737) Provided verbal/tactile cueing for activities related to improving balance, coordination, kinesthetic sense, posture, motor skill, proprioception  to assist with LE, proximal hip, and core control in self care, mobility, lifting, ambulation and eccentric single leg control.      NMR and Therapeutic Activities:    [x] (84414 or 29338) Provided verbal/tactile cueing for activities related to improving balance, coordination, kinesthetic sense, posture, motor skill, proprioception and motor activation to allow for proper function of core, proximal hip and LE with self care and ADLs  [] (34965) Gait Re-education- Provided training and instruction to the patient for proper LE, core and proximal hip recruitment and positioning and eccentric body weight control with ambulation re-education including up and down stairs     Home Exercise Program:    [x] (81368) Reviewed/Progressed HEP activities related to strengthening, flexibility, endurance, ROM of core, proximal hip and LE for functional self-care, mobility, lifting and ambulation/stair navigation   [] (22575)Reviewed/Progressed HEP activities related to improving balance, coordination, kinesthetic sense, posture, motor skill, proprioception of core, proximal hip and LE for self care, mobility, lifting, and ambulation/stair navigation      Manual Treatments:  PROM / STM / Oscillations-Mobs:  G-I, II, III, IV (PA's, Inf., Post.)  [] (90694) Provided manual therapy to mobilize LE, proximal hip and/or LS spine soft tissue/joints for the purpose of modulating pain, promoting relaxation,  increasing ROM, reducing/eliminating soft tissue swelling/inflammation/restriction, improving soft tissue extensibility and allowing for proper ROM for normal function with self care, mobility, lifting and ambulation. Modalities:      Charges:  Timed Code Treatment Minutes: 36'   Total Treatment Minutes: 1:15-2:05  50'       [] EVAL (LOW) 455 1011 (typically 20 minutes face-to-face)  [] EVAL (MOD) 73570 (typically 30 minutes face-to-face)  [] EVAL (HIGH) 81237 (typically 45 minutes face-to-face)  [] RE-EVAL   [x] GF(04536) x  1   [] IONTO  [x] NMR (62442) x 1    [] VASO  [] Manual (81643) x      [] Other:  [x] TA x 1     [] Mech Traction (18913)  [] ES(attended) (72910)      [] ES (un) (66927):     GOALS:   Patient stated goal: Run and exercise without pain     [x] Progressing: [] Met: [] Not Met: [] Adjusted     Therapist goals for Patient:   Short Term Goals: To be achieved in: 3 weeks  1. Independent in HEP and progression per patient tolerance, in order to prevent re-injury. [] Progressing: [x] Met: [] Not Met: [] Adjusted   2. Patient will have a decrease in pain to facilitate improvement in movement, function, and ADLs as indicated by Functional Deficits. [] Progressing: [x] Met: [] Not Met: [] Adjusted     Long Term Goals: To be achieved in: 6 weeks  1. Patient will score 73 or higher on FOTO knee assessment indicating subjective improvement with functional activities. [] Progressing: [] Met: [x] Not Met: [] Adjusted pt reports subjective improvements but score remains unchanged on assessment   2. Patient will demonstrate increased R knee AROM to 145 deg knee flexion in order to squat and kneel without any pain or limitations. [] Progressing: [x] Met: [] Not Met: [] Adjusted   3. Patient will demonstrate an increase in LE strength to 5/5 in all directions tested allowing pt to complete all ADL's in weight bearing with no restriction or pain. [] Progressing: [x] Met: [] Not Met: [] Adjusted  4. Patient will negotiate a flight of stairs with normal sequencing, no assistance and no c/o pain. [] Progressing: [x] Met: [] Not Met: [] Adjusted  5. Patient will complete DL squat with normal depth, no compensatory movement and no c/o pain in order to progress to return to running program independently. [x] Progressing: [] Met: [] Not Met: [] Adjusted       Overall Progression Towards Functional goals/ Treatment Progress Update:  [x] Patient is progressing as expected towards functional goals listed with increase ROM and strength. She continues to have restrictions and limitations with squatting related activities. Based on progress and competence with HEP she is appropriate for discharge to transition to completing HEP independently. [] Progression is slowed due to complexities/Impairments listed. [] Progression has been slowed due to co-morbidities.   [] Plan just implemented, too soon to assess goals progression <30days   [] Goals require adjustment due to lack of progress  [] Patient is not progressing as expected and requires additional follow up with physician  [] Other    Prognosis for POC: [x] Good [] Fair  [] Poor      Patient requires continued skilled intervention: [x] Yes  [] No    Treatment/Activity Tolerance:  [x] Patient able to complete treatment  [] Patient limited by fatigue  [] Patient limited by pain     [] Patient limited by other medical complications  [] Other: 11/8 Updated HEP based on progress and pt completed treatment with proper form and no increase in pain. Based on progress and competence with HEP she is appropriate to transition to completing HEP independently and will be discharged unless she experiences exacerbation in symptoms in the next 30 days. Patient Education:             11/8 Time spent reviewing proper form with squatting and patellofemoral precautions based on objective measurements and presentation of symptoms. 11/1 Updated HEP based tolerance to treatment   10/25 Updated HEP  10/18 Withheld wall sits and LAQ from HEP in order to decrease stress on patellofemoral joint and increased intensity of exercises in non weight bearing   10/11 Updated HEP   10/4 Reviewed difference between discomfort from stretching and muscle fatigue compared to knee pain. Updated HEP  9/27 Educated on objective findings and relationship to symptoms and functional limitations. Educated on POC in order to achieve goals. Reviewed precautions including avoiding lunges, squats and high impact activities until knee pain has subsided. Educated on use of ice for symptom control      HEP instruction:   Access Code: [email or web address]      PLAN: See eval  [] Continue per plan of care [] Alter current plan (see comments above)  [] Plan of care initiated [] Hold pending MD visit [x] Discharge  11/8 Based on progress and competence with HEP pt will be discharged from PT to complete HEP independently unless she experiences exacerbation insymptoms in the next 30 days.      Electronically signed by:  Niranjan Francis PT    Note: If patient does not return for scheduled/ recommended follow up visits, this note will serve as a discharge from care along with most recent update on progress.

## 2022-12-13 ENCOUNTER — OFFICE VISIT (OUTPATIENT)
Dept: PRIMARY CARE CLINIC | Age: 42
End: 2022-12-13
Payer: COMMERCIAL

## 2022-12-13 VITALS
WEIGHT: 125.8 LBS | BODY MASS INDEX: 22.29 KG/M2 | HEIGHT: 63 IN | HEART RATE: 70 BPM | TEMPERATURE: 96.9 F | SYSTOLIC BLOOD PRESSURE: 122 MMHG | RESPIRATION RATE: 18 BRPM | OXYGEN SATURATION: 96 % | DIASTOLIC BLOOD PRESSURE: 76 MMHG

## 2022-12-13 DIAGNOSIS — H60.332 ACUTE SWIMMER'S EAR OF LEFT SIDE: ICD-10-CM

## 2022-12-13 DIAGNOSIS — H93.8X2 EAR FULLNESS, LEFT: Primary | ICD-10-CM

## 2022-12-13 PROCEDURE — 99213 OFFICE O/P EST LOW 20 MIN: CPT | Performed by: STUDENT IN AN ORGANIZED HEALTH CARE EDUCATION/TRAINING PROGRAM

## 2022-12-13 RX ORDER — FLUTICASONE PROPIONATE 50 MCG
1 SPRAY, SUSPENSION (ML) NASAL DAILY
Qty: 16 G | Refills: 0 | Status: SHIPPED | OUTPATIENT
Start: 2022-12-13

## 2022-12-13 RX ORDER — CIPROFLOXACIN/HYDROCORTISONE 0.2 %-1 %
3 SUSPENSION, DROPS(FINAL DOSAGE FORM)(ML) OTIC (EAR) 2 TIMES DAILY
Qty: 10 ML | Refills: 0 | Status: SHIPPED | OUTPATIENT
Start: 2022-12-13 | End: 2022-12-20

## 2022-12-13 RX ORDER — CETIRIZINE HYDROCHLORIDE 10 MG/1
10 TABLET ORAL DAILY
Qty: 30 TABLET | Refills: 0 | Status: SHIPPED | OUTPATIENT
Start: 2022-12-13 | End: 2023-01-12

## 2022-12-13 SDOH — ECONOMIC STABILITY: FOOD INSECURITY: WITHIN THE PAST 12 MONTHS, YOU WORRIED THAT YOUR FOOD WOULD RUN OUT BEFORE YOU GOT MONEY TO BUY MORE.: NEVER TRUE

## 2022-12-13 SDOH — ECONOMIC STABILITY: FOOD INSECURITY: WITHIN THE PAST 12 MONTHS, THE FOOD YOU BOUGHT JUST DIDN'T LAST AND YOU DIDN'T HAVE MONEY TO GET MORE.: NEVER TRUE

## 2022-12-13 ASSESSMENT — SOCIAL DETERMINANTS OF HEALTH (SDOH): HOW HARD IS IT FOR YOU TO PAY FOR THE VERY BASICS LIKE FOOD, HOUSING, MEDICAL CARE, AND HEATING?: SOMEWHAT HARD

## 2022-12-13 NOTE — PROGRESS NOTES
Salina Ortiz (:  1980) is a 38 y.o. female,Established patient, here for evaluation of the following chief complaint(s):  Ear Problem (left)         ASSESSMENT/PLAN:  1. Ear fullness, left  -     cetirizine (ZYRTEC) 10 MG tablet; Take 1 tablet by mouth daily, Disp-30 tablet, R-0Normal  -     fluticasone (FLONASE) 50 MCG/ACT nasal spray; 1 spray by Each Nostril route daily, Disp-16 g, R-0Normal  2. Acute swimmer's ear of left side  -     ciprofloxacin-hydrocortisone (CIPRO HC) 0.2-1 % otic suspension; Place 3 drops into the left ear 2 times daily for 7 days, Disp-10 mL, R-0Normal    Return if symptoms worsen or fail to improve. Subjective   SUBJECTIVE/OBJECTIVE:  HPI    Cold sx's 6 weeks ago and 3 weeks of left ear congestion no pain anymore, doing afrin nose spray x 2 days and ear pain got better but now worse again    Review of Systems   All other systems reviewed and are negative. Objective   Physical Exam  Constitutional:       Appearance: Normal appearance. HENT:      Head: Normocephalic and atraumatic. Right Ear: Tympanic membrane, ear canal and external ear normal. There is no impacted cerumen. Ears:      Comments: Cerumen impaction    After irrigation, canal erythematous, did not fully visualize TM     Nose: Nose normal.      Mouth/Throat:      Mouth: Mucous membranes are moist.   Eyes:      Extraocular Movements: Extraocular movements intact. Cardiovascular:      Rate and Rhythm: Normal rate and regular rhythm. Heart sounds: Normal heart sounds. No murmur heard. No friction rub. No gallop. Pulmonary:      Effort: Pulmonary effort is normal.      Breath sounds: Normal breath sounds. No wheezing, rhonchi or rales. Skin:     General: Skin is warm. Neurological:      General: No focal deficit present. Mental Status: She is alert.    Psychiatric:         Mood and Affect: Mood normal.                An electronic signature was used to authenticate this note.    --Lulú Keith MD

## 2023-04-25 RX ORDER — CITALOPRAM 10 MG/1
10 TABLET ORAL DAILY
Qty: 90 TABLET | Refills: 1 | Status: SHIPPED | OUTPATIENT
Start: 2023-04-25

## 2023-04-25 NOTE — TELEPHONE ENCOUNTER
Medication:   Requested Prescriptions     Pending Prescriptions Disp Refills    citalopram (CELEXA) 10 MG tablet 90 tablet 1     Sig: Take 1 tablet by mouth daily        Last Filled:      Patient Phone Number: [phone] (home)     Last appt: 12/13/2022   Next appt: Visit date not found    Last OARRS: No flowsheet data found. Preferred Pharmacy:   [facility] [address] [facility] WILKS [address] [phone] Illa Delay [phone]  [address] [place] [address]  Phone: [phone] Fax: [phone]  Medication:   Requested Prescriptions     Pending Prescriptions Disp Refills    citalopram (CELEXA) 10 MG tablet 90 tablet 1     Sig: Take 1 tablet by mouth daily        Last Filled:      Patient Phone Number: [phone] (home)     Last appt: 12/13/2022   Next appt: Visit date not found    Last OARRS: No flowsheet data found.     Preferred Pharmacy:   [facility] [address] STE[address]AN[address] WILKS [address] [phone] Ill[place] Delay [phone]  [address] [address]  Phone: [phone] Fax: [phone]

## 2023-10-17 SDOH — ECONOMIC STABILITY: FOOD INSECURITY: WITHIN THE PAST 12 MONTHS, YOU WORRIED THAT YOUR FOOD WOULD RUN OUT BEFORE YOU GOT MONEY TO BUY MORE.: NEVER TRUE

## 2023-10-17 SDOH — ECONOMIC STABILITY: TRANSPORTATION INSECURITY
IN THE PAST 12 MONTHS, HAS LACK OF TRANSPORTATION KEPT YOU FROM MEETINGS, WORK, OR FROM GETTING THINGS NEEDED FOR DAILY LIVING?: NO

## 2023-10-17 SDOH — ECONOMIC STABILITY: FOOD INSECURITY: WITHIN THE PAST 12 MONTHS, THE FOOD YOU BOUGHT JUST DIDN'T LAST AND YOU DIDN'T HAVE MONEY TO GET MORE.: NEVER TRUE

## 2023-10-17 SDOH — ECONOMIC STABILITY: INCOME INSECURITY: HOW HARD IS IT FOR YOU TO PAY FOR THE VERY BASICS LIKE FOOD, HOUSING, MEDICAL CARE, AND HEATING?: NOT VERY HARD

## 2023-10-17 SDOH — ECONOMIC STABILITY: HOUSING INSECURITY
IN THE LAST 12 MONTHS, WAS THERE A TIME WHEN YOU DID NOT HAVE A STEADY PLACE TO SLEEP OR SLEPT IN A SHELTER (INCLUDING NOW)?: NO

## 2023-10-17 ASSESSMENT — PATIENT HEALTH QUESTIONNAIRE - PHQ9
SUM OF ALL RESPONSES TO PHQ9 QUESTIONS 1 & 2: 0
2. FEELING DOWN, DEPRESSED OR HOPELESS: 0
1. LITTLE INTEREST OR PLEASURE IN DOING THINGS: 0
SUM OF ALL RESPONSES TO PHQ QUESTIONS 1-9: 0
1. LITTLE INTEREST OR PLEASURE IN DOING THINGS: NOT AT ALL
SUM OF ALL RESPONSES TO PHQ QUESTIONS 1-9: 0
SUM OF ALL RESPONSES TO PHQ9 QUESTIONS 1 & 2: 0
2. FEELING DOWN, DEPRESSED OR HOPELESS: NOT AT ALL
SUM OF ALL RESPONSES TO PHQ QUESTIONS 1-9: 0
SUM OF ALL RESPONSES TO PHQ QUESTIONS 1-9: 0

## 2023-10-18 ENCOUNTER — OFFICE VISIT (OUTPATIENT)
Dept: PRIMARY CARE CLINIC | Age: 43
End: 2023-10-18
Payer: COMMERCIAL

## 2023-10-18 VITALS
BODY MASS INDEX: 22.86 KG/M2 | TEMPERATURE: 97.1 F | OXYGEN SATURATION: 99 % | HEIGHT: 63 IN | HEART RATE: 68 BPM | SYSTOLIC BLOOD PRESSURE: 128 MMHG | RESPIRATION RATE: 16 BRPM | DIASTOLIC BLOOD PRESSURE: 80 MMHG | WEIGHT: 129 LBS

## 2023-10-18 DIAGNOSIS — Z80.3 FAMILY HISTORY OF BREAST CANCER: ICD-10-CM

## 2023-10-18 DIAGNOSIS — N63.23 MASS OF LOWER OUTER QUADRANT OF LEFT BREAST: Primary | ICD-10-CM

## 2023-10-18 PROCEDURE — 99214 OFFICE O/P EST MOD 30 MIN: CPT | Performed by: FAMILY MEDICINE

## 2023-10-18 ASSESSMENT — ENCOUNTER SYMPTOMS
EYE DISCHARGE: 0
ABDOMINAL PAIN: 0
NAUSEA: 0
COUGH: 0
DIARRHEA: 0
VOMITING: 0
TROUBLE SWALLOWING: 0
SORE THROAT: 0
EYE ITCHING: 0
SHORTNESS OF BREATH: 0

## 2023-10-18 NOTE — PROGRESS NOTES
Jennyfer Maher (:  1980) is a 38 y.o. female,Established patient, here for evaluation of the following chief complaint(s):  Breast Mass      ASSESSMENT/PLAN:  1. Mass of lower outer quadrant of left breast  -     GIBRAN ROSA ISELA DIGITAL DIAGNOSTIC BILATERAL; Future  2. Family history of breast cancer  Assessment & Plan:     Orders:  -     GIBRAN ROSA ISELA DIGITAL DIAGNOSTIC BILATERAL; Future      No follow-ups on file. SUBJECTIVE/OBJECTIVE:  HPI    Patient presents for Breast Mass  - Left Breast  - noticed it last Thursday - some pain - pain since week  - \"feels like a solid mass\"  - no nipple discharge or changes to skin  - Fam history of BC in paternal GM  - not sure if its gotten bigger   - check left axilla - some tenderness - no lump  - LMP 2-3 weeks ago, has regular periods  - has 4 children - breast fed all of them    Review of Systems   Constitutional:  Negative for appetite change, chills, fatigue and fever. HENT:  Negative for congestion, ear pain, sneezing, sore throat and trouble swallowing. Eyes:  Negative for discharge and itching. Respiratory:  Negative for cough and shortness of breath. Cardiovascular:  Negative for chest pain and leg swelling. Gastrointestinal:  Negative for abdominal pain, diarrhea, nausea and vomiting. Genitourinary:  Negative for dysuria and urgency. Musculoskeletal:  Negative for joint swelling and neck pain. Left breast mass  Some tenderness   Neurological:  Negative for light-headedness and headaches. Psychiatric/Behavioral:  Negative for dysphoric mood. The patient is not nervous/anxious. Physical Exam  Exam conducted with a chaperone present. Constitutional:       General: She is not in acute distress. Appearance: Normal appearance. HENT:      Head: Normocephalic and atraumatic. Nose: Nose normal. No congestion or rhinorrhea. Eyes:      General:         Right eye: No discharge. Left eye: No discharge.       Extraocular

## 2023-10-24 ENCOUNTER — HOSPITAL ENCOUNTER (OUTPATIENT)
Dept: ULTRASOUND IMAGING | Age: 43
Discharge: HOME | End: 2023-10-24
Attending: FAMILY MEDICINE
Payer: COMMERCIAL

## 2023-10-24 ENCOUNTER — HOSPITAL ENCOUNTER (OUTPATIENT)
Dept: MAMMOGRAPHY | Age: 43
Discharge: HOME | End: 2023-10-24
Attending: FAMILY MEDICINE
Payer: COMMERCIAL

## 2023-10-24 ENCOUNTER — TELEPHONE (OUTPATIENT)
Dept: PRIMARY CARE CLINIC | Age: 43
End: 2023-10-24

## 2023-10-24 DIAGNOSIS — R92.8 ABNORMAL MAMMOGRAM: ICD-10-CM

## 2023-10-24 DIAGNOSIS — Z80.3 FAMILY HISTORY OF BREAST CANCER: ICD-10-CM

## 2023-10-24 DIAGNOSIS — N63.23 MASS OF LOWER OUTER QUADRANT OF LEFT BREAST: ICD-10-CM

## 2023-10-24 DIAGNOSIS — R92.30 DENSE BREAST TISSUE ON MAMMOGRAM, UNSPECIFIED TYPE: Primary | ICD-10-CM

## 2023-10-24 PROCEDURE — G0279 TOMOSYNTHESIS, MAMMO: HCPCS

## 2023-10-24 PROCEDURE — 76642 ULTRASOUND BREAST LIMITED: CPT

## 2023-10-24 NOTE — TELEPHONE ENCOUNTER
Called and spoke with patient  Reviewed the results again, there was some discussion about dense breasts and that MRI should be ordered  Advised patient that I will go ahead and order it but she should schedule it and make sure it is covered by her insurance  Stat creatinine to be done before the MRI  Patient agreeable to plan and demonstrated understand

## 2023-10-24 NOTE — TELEPHONE ENCOUNTER
Pt saw mammogram results on mychart. Pt states that an MRI of the breasts should be ordered. Pls call pt back to discuss.

## 2023-10-27 RX ORDER — CITALOPRAM HYDROBROMIDE 10 MG/1
10 TABLET ORAL DAILY
Qty: 90 TABLET | Refills: 0 | Status: SHIPPED | OUTPATIENT
Start: 2023-10-27

## 2023-10-27 NOTE — TELEPHONE ENCOUNTER
March 14, 2019       Gladys MENDEZ Sandy  [address]  [facility] [address]        Dear Ms. Sandy,    Please review the enclosed prep instructions for your procedure with Albaro Stauffer MD.    Procedure and arrival times may occasionally change. Depending on the facility where your procedure is scheduled, you may receive a phone call prior to confirm those times.    Date of Procedure: April 29, 2019  Time of Procedure: Someone from the hospital will call you 2-3 business days prior with your procedure and arrival times.    Location:  [facility]  [address] [place]  [address]  [phone]  Please enter the main entrance, pass the glass elevator, and follow the orange signs for GI Services.     If you have any questions, please call the Gastrointestinal (GI) Department at [phone].    Thank you,    Open Access Scheduling Patient Line [phone]  Surgery Scheduler for Albaro Stauffer MD    14 Days Prior to Procedure:  • Hold Phentermine    7 Days Prior to Procedure:  • Do not eat popcorn, seeds, nuts or whole kernel corn.  • Do not take iron supplements or Pepto-Bismol.  • Hold Plavix or Clopidogrel for 5-7 days prior to the procedure   Please contact your cardiologist or Primary Care physician if you have questions or concerns about holding your Plavix or Coumadin or any generic brand.  Purchase prep from pharmacy if not a hospital prep.   · For Diabetics: Contact you primary care physician for recommendations regarding insulin on the day of you procedure.  • See bowel prep instructions below.    3 Days Prior to Procedure:  • If you take Coumadin, Warfarin, Xaralto or Pradaxa, please call your Primary Care Provider/Cardiologist.    Day Before Procedure:  • You may drink clear liquids only the day before your procedure (see clear liquid diet at the end of the letter).  • Do not drink alcoholic beverages for the 24 hours before your procedure.  • Do not take Lomotil, Imodium,  Medication:   Requested Prescriptions     Pending Prescriptions Disp Refills    citalopram (CELEXA) 10 MG tablet [Pharmacy Med Name: Citalopram Hydrobromide 10 MG Oral Tablet] 90 tablet 0     Sig: Take 1 tablet by mouth once daily     Last Filled:  4.25.23    Last appt: 10/18/2023   Next appt: Visit date not found    Last OARRS:        No data to display Effer-syllium, Metamucil, Citrucel, Konsyl, Hydrocil or FiberCon.  • Mix prep according to the package directions (refrigerate in the morning if needed).  • Do not eat or drink anything after midnight.    Day of Procedure:  • Do not take oral diabetic medication on the day of your procedure.   • You will be instructed during the call from the preadmission nurse at the designated Surgery Center regarding the medications you should take the morning of your procedure. Questions regarding pre-procedure medications should be directed to the Surgery Center listed on the first page of these instructions.                            BOWEL PREP INSTRUCTIONS    Bowel Prep with Nulytely the Day Before Your Procedure:  · In the morning, mix the Nulytely prep with the flavor packet and one gallon of water, shake well to mix, and refrigerate to chill. Do not further dilute the prep in any way.  · At 5:00 pm, start drinking half of the Nulytely prep. Drink one large (8-10 oz.) glass every 10-15 minutes. In most cases, loose, liquidy bowel movements will begin within 30 minutes to one hour. You should remain within easy access of a bathroom. Continue to drink the prep regardless of stool frequency.  · You will drink half of the Nulytely prep now. The remaining 1/2 gallon will be used tomorrow. Put the remaining prep solution in the refrigerator.   · Continue to drink clear liquids (your physician recommends 16 oz of Gatorade) throughout the evening.    Bowel Prep with Nulytely the Day of Your Procedure:   · Starting 4 hours prior to your procedure drink the remainder of Nulytely prep over 1 hour.  · Do not eat or drink anything after you finish the prep.  · You must not have anything to drink 2 hours prior to the procedure.   · Take heart and/or blood pressure medications with a small sip of water. Do not take any other medications until after the procedure.       CLEAR LIQUID DIET    Clear Liquid Diet - You May Consume:  • Tea,  coffee (decaf or regular), clear carbonated beverages (Sprite, 7Up, ginger ale)  • Gatorade (no purple, orange, blue, or red)  • Strained fruit juices - no pulp (apple, white grape, lemonade)  • Soups - clear broth or consommé only  • Desserts - gelatin (no purple, orange, blue, or red flavors, and no fruit or toppings)  • Miscellaneous - sugar, honey, syrup, clear hard candy, salt    Clear Liquid Diet - Do Not Consume:  • Dairy - milk drinks/dairy products  • Protein - meat and meat substitutes  • Vegetables  • Fruits or fruit juices with unstrained pulp  • Grains and starches  • Desserts (except gelatins allowed)  • Fats    ADDITIONAL INSTRUCTIONS    · Empty Bowel: In order to proceed with your procedure, your bowels must be empty.  If your doctor is not able to clearly view your colon and you will risk cancellation or rescheduling of your procedure. Your stools should have a clear to see-through, cloudy, yellow appearance with no formed substance. If your stools are darker or have formed substance, please call the location where your procedure is scheduled to be done and ask for the pre-op nurse, who will get further instructions from your physician.  · Should you experience rectal irritation due to frequent stooling you may apply Desitin, Balmex, Vitamin A&D ointment or other similar product to the irritated area.  · Transporation:  A responsible family member/friend must come with you to your procedure and remain in the procedure area until you are discharged.  You are not allowed to drive, take a taxi, ride a bus, or leave the procedure center alone.  This is due to the sedation that you will receive during your procedure, and is for your safety as well as the safety of others.  · Do not plan on going to work or doing anything for the rest of the procedure day. You may resume eating/drinking with limitations following your procedure, do not consume alcohol.  · Pre-Procedure Testing: Some patients may need to  complete pre-procedure testing prior to their procedure date as part of the surgery center's requirements.  You will be notified of any required tests, orders will be entered into our electronic system and you can go to any [facility] lab to have the testing completed. No appointment is necessary for the lab and you do not need to be fasting.    Thank you for choosing [facility]

## 2023-10-30 ENCOUNTER — TELEPHONE (OUTPATIENT)
Dept: PRIMARY CARE CLINIC | Age: 43
End: 2023-10-30

## 2023-10-30 NOTE — TELEPHONE ENCOUNTER
Please advise patient that her breast MRI bilateral has been approved by her insurance    I will scan a copy of this approval in her chart  However it has to be done between October 26 to December 25, 2023    Dr. Fawn Padron

## 2023-11-01 ENCOUNTER — HOSPITAL ENCOUNTER (OUTPATIENT)
Dept: MRI IMAGING | Age: 43
Discharge: HOME | End: 2023-11-01
Payer: COMMERCIAL

## 2023-11-01 ENCOUNTER — PATIENT MESSAGE (OUTPATIENT)
Dept: PRIMARY CARE CLINIC | Age: 43
End: 2023-11-01

## 2023-11-01 DIAGNOSIS — R92.30 DENSE BREAST TISSUE ON MAMMOGRAM, UNSPECIFIED TYPE: ICD-10-CM

## 2023-11-01 DIAGNOSIS — R92.8 ABNORMAL MRI, BREAST: Primary | ICD-10-CM

## 2023-11-01 DIAGNOSIS — Z80.3 FAMILY HISTORY OF BREAST CANCER: ICD-10-CM

## 2023-11-01 DIAGNOSIS — N63.23 MASS OF LOWER OUTER QUADRANT OF LEFT BREAST: ICD-10-CM

## 2023-11-01 PROCEDURE — A9579 GAD-BASE MR CONTRAST NOS,1ML: HCPCS | Performed by: FAMILY MEDICINE

## 2023-11-01 PROCEDURE — 6360000004 HC RX CONTRAST MEDICATION: Performed by: FAMILY MEDICINE

## 2023-11-01 PROCEDURE — C8908 MRI W/O FOL W/CONT, BREAST,: HCPCS

## 2023-11-01 RX ADMIN — GADOTERIDOL 10 ML: 279.3 INJECTION, SOLUTION INTRAVENOUS at 09:25

## 2023-11-01 NOTE — TELEPHONE ENCOUNTER
From: Kathi Bolanos  To: Dr. Мария Hawkins  Sent: 11/1/2023 1:31 PM EDT  Subject: Help with Scheduling? Omero Beard,    I called over to the [address] [place] [address] office and the  said she couldn't schedule my apt until she talks to the nurse (who is at a different location today). She said she would call me tomorrow to schedule. I'm obviously a little anxious about all of this and would like to get in as soon as possible. It was frustrating to be told I can't even schedule an apt. Not sure if there is anything you can do, but the waiting throughout this entire process has been really tough, so I thought I'd reach out just in case you can do something so I can get the apt. scheduled today.     Thank you,  Kathi Bolanos

## 2023-11-02 ENCOUNTER — TELEPHONE (OUTPATIENT)
Dept: SURGERY | Age: 43
End: 2023-11-02

## 2023-11-02 NOTE — TELEPHONE ENCOUNTER
Spoke to patient about her concerns and wanting to be seen sooner. Explained the imaging reports and that there is not an immediate suspicion of concern for malignancy. Had there been that concern, a biopsy would have been arranged. Discussed breast pain caused by cysts and measure to take for relief. Voltaren gel can be applied 1-3 times daily  Reduce or eliminate caffeine  Get fitted for a bra. Informed patient that we can put her on a cancellation list. She is concerned that her  will be out of the [place] 10 [place] and will not be present at the appointment. Informed that should an earlier appointment open up, we will certainly reach out. Patient agreeable to plan.   New patient intake collected

## 2023-11-03 NOTE — TELEPHONE ENCOUNTER
Breast History:  History of Previous Breast Biopsy:age 17-Right breast excision, non malignant, doesn't recall any details of where,what doctor, etc.    Self Breast Exams Completed:Yes  Family History of Breast Cancer:P-Gm early 52's,  age unknown  Family History of Other Cancers:  P-Uncle-Bone,   P-GF-Bone,   Father-skin, other unknown cancers, living  Ashkenazi Latter day Decent:None  Bra Size: 36A  JENNY Risk Assessment 21.1%    Gyne History:  : 4  Para: 3  Age of Menarche: 4  Age of Menopause: n/a  Age of first live Birth: 23  History of Hysterectomy / MATTHEW-BSO: N/A  History of OCP's: 4  HRT:None  Family History or personal history of Ovarian Cancer: None    Lifestyle:  Smoker ( Current / Former ):None   ETOH ( alcohol consumption ):rare  Exercise:Walking, weights, pickle ball  Caffeine:2 Coffee daily  Drug use : None ( including THC/ Mariajuana )    Bilateral breast pain Since September, denies redness, skin changes, nipple discharge.   Education provided for breast pain measures including use of Voltaren gel, reducing or eliminating caffeine, and wearing a well fitting bra

## 2023-11-07 NOTE — TELEPHONE ENCOUNTER
Patient is scheduled 11/13/2023 with breast specialist.  See phone encounter from 11/2/2023. Patient spoke with breast center.   They placed her on cancellation list

## 2023-11-13 ENCOUNTER — OFFICE VISIT (OUTPATIENT)
Dept: BREAST CENTER | Age: 43
End: 2023-11-13
Payer: COMMERCIAL

## 2023-11-13 ENCOUNTER — HOSPITAL ENCOUNTER (OUTPATIENT)
Dept: ULTRASOUND IMAGING | Age: 43
Discharge: HOME | End: 2023-11-13
Payer: COMMERCIAL

## 2023-11-13 ENCOUNTER — TELEPHONE (OUTPATIENT)
Dept: BREAST CENTER | Age: 43
End: 2023-11-13

## 2023-11-13 VITALS — RESPIRATION RATE: 17 BRPM | WEIGHT: 129.6 LBS | BODY MASS INDEX: 22.96 KG/M2 | HEIGHT: 63 IN

## 2023-11-13 DIAGNOSIS — R92.8 ABNORMAL MRI, BREAST: Primary | ICD-10-CM

## 2023-11-13 DIAGNOSIS — N60.02 BENIGN BREAST CYST IN FEMALE, LEFT: ICD-10-CM

## 2023-11-13 DIAGNOSIS — R92.8 ABNORMAL MRI, BREAST: ICD-10-CM

## 2023-11-13 DIAGNOSIS — N60.02 CYST (SOLITARY) OF BREAST, LEFT: Primary | ICD-10-CM

## 2023-11-13 PROCEDURE — 2709999900 US ASP BREAST CYST LEFT

## 2023-11-13 PROCEDURE — 99243 OFF/OP CNSLTJ NEW/EST LOW 30: CPT | Performed by: SURGERY

## 2023-11-13 PROCEDURE — 76642 ULTRASOUND BREAST LIMITED: CPT

## 2023-11-13 NOTE — PROGRESS NOTES
Breast Biopsy Nursing Note    NAME:  Susan Whitlock  YOB: 1980 GENDER: female  MEDICAL RECORD NUMBER:  [id number]  DATE:  11/13/2023    Patient was seen in Dr. Yokasta Romo office today and then sent to Ultrasound for a left breast cyst aspiration. Left breast cyst aspiration completed by . Hemostasis achieved via site pressure; Site cleansed with alcohol  Steri-strips applied along with small amount of bacitracin-neomycin polymixin then Coverderm   Aspirated fluid was placed in proper container/s and labeled. Fluid was taken to Pathology for evaluation per order. Procedural Pain:  0  / 10     Post Procedural Pain:  0 / 10     Procedure well tolerated. Pt stable and alert and oriented x 3 upon discharge. Pt was provided education and all questions answered.        Electronically signed by Kristin Hernandez RN on 11/13/2023 at 3:35 PM

## 2023-11-13 NOTE — PATIENT INSTRUCTIONS
Breast exam performed- palpable areas noted bilaterally    Imaging reviewed-Benign fluid filled cysts  5mm spots in both breasts that will be followed closely for 6 months to monitor for changes    Healthy Lifestyle Recommendations: healthy diet (decrease consumption of red meat, increase fresh fruits and vegetables), decreased alcohol consumption (less than 4 drinks/week), adequate sleep (goal 6-8 hours), routine exercise (goal 150 minutes/week or greater), weight control. Continue self breast exams    Breast pain help:  Avoid caffeine and nicotine  Get professionally fitted for a bra  Apply Voltaren OTC gel 2-3 times daily, about a nickel sized amount, massage into breasts.  Can find in the arthritis section of the store  Vitamin E 400 units-1 tab by mouth daily, will not notice an immediate difference but take it until the bottle is gone and see if you notice an improvement with breast inflammation    Plan:  Bilateral limited breast u/s with left breast cyst aspiration today  Return in 6 months for a breast check with Kelsea Ortega CNP

## 2023-11-13 NOTE — PROGRESS NOTES
2023    Chief Complaint   Patient presents with    New Patient     Referred by Glenn Carson MD- Abnormal MRI, bilateral breast pain, worse on left side since September         HPI Patient is here for evaluation of a mass she felt in her left breast a few weeks ago. She also describes having bilateral breast pain, worse on left side recently. Bilateral diagnostic mammogram and bilateral ultrasound 10/2023 showed multiple benign cysts and extremely dense breast tissue. MRI 2023 showed indeterminate 5 mm lesions in both breasts 10-11:00. Bilateral second look ultrasound recommended. Breast History:  History of Previous Breast Biopsy:age 17-Right breast excision, non malignant, doesn't recall any details of where,what doctor, etc.     Self Breast Exams Completed:Yes  Family History of Breast Cancer:P-Gm early 52's,  age unknown  Family History of Other Cancers:  P-Uncle-Bone,   P-GF-Bone,   Father-skin, other unknown cancers, living  Ashkenazi Anabaptist Decent:None  Bra Size: 36A  JENNY Risk Assessment 21.1%     Gyne History:  : 4  Para: 3  Age of Menarche: 4  Age of Menopause: n/a  Age of first live Birth: 23  History of Hysterectomy / MATTHEW-BSO: N/A  History of OCP's: 4  HRT:None  Family History or personal history of Ovarian Cancer: None     Lifestyle:  Smoker ( Current / Former ):None   ETOH ( alcohol consumption ):rare  Exercise:Walking, weights, pickle ball  Caffeine:2 Coffee daily  Drug use : None ( including THC/ Mariajuana )     Bilateral breast pain Since September, denies redness, skin changes, nipple discharge.   Education provided for breast pain measures including use of Voltaren gel, reducing or eliminating caffeine, and wearing a well fitting bra      Current Outpatient Medications:     citalopram (CELEXA) 10 MG tablet, Take 1 tablet by mouth once daily, Disp: 90 tablet, Rfl: 0    fluticasone (FLONASE) 50 MCG/ACT nasal spray, 1 spray by Each Nostril

## 2023-11-18 LAB
BACTERIA SPEC AEROBE CULT: NORMAL
BACTERIA SPEC ANAEROBE CULT: NORMAL
GRAM STN SPEC: NORMAL

## 2023-12-14 ENCOUNTER — OFFICE VISIT (OUTPATIENT)
Dept: PRIMARY CARE CLINIC | Age: 43
End: 2023-12-14
Payer: COMMERCIAL

## 2023-12-14 VITALS
OXYGEN SATURATION: 98 % | DIASTOLIC BLOOD PRESSURE: 70 MMHG | HEART RATE: 71 BPM | SYSTOLIC BLOOD PRESSURE: 112 MMHG | WEIGHT: 129 LBS | RESPIRATION RATE: 12 BRPM | TEMPERATURE: 97.6 F | BODY MASS INDEX: 22.85 KG/M2

## 2023-12-14 DIAGNOSIS — R20.2 NUMBNESS AND TINGLING IN BOTH HANDS: Primary | ICD-10-CM

## 2023-12-14 DIAGNOSIS — R20.0 NUMBNESS AND TINGLING IN BOTH HANDS: Primary | ICD-10-CM

## 2023-12-14 DIAGNOSIS — R20.0 NUMBNESS AND TINGLING IN BOTH HANDS: ICD-10-CM

## 2023-12-14 DIAGNOSIS — R20.0 NUMBNESS OF TONGUE: ICD-10-CM

## 2023-12-14 DIAGNOSIS — R47.89 EPISODE OF CHANGE IN SPEECH: ICD-10-CM

## 2023-12-14 DIAGNOSIS — R20.2 NUMBNESS AND TINGLING IN BOTH HANDS: ICD-10-CM

## 2023-12-14 LAB
ANION GAP SERPL CALCULATED.3IONS-SCNC: 8 MMOL/L (ref 3–16)
BUN SERPL-MCNC: 12 MG/DL (ref 7–20)
CALCIUM SERPL-MCNC: 8.9 MG/DL (ref 8.3–10.6)
CHLORIDE SERPL-SCNC: 104 MMOL/L (ref 99–110)
CO2 SERPL-SCNC: 27 MMOL/L (ref 21–32)
CREAT SERPL-MCNC: 0.7 MG/DL (ref 0.6–1.1)
FOLATE SERPL-MCNC: 10.3 NG/ML (ref 4.78–24.2)
GFR SERPLBLD CREATININE-BSD FMLA CKD-EPI: >60 ML/MIN/{1.73_M2}
GLUCOSE SERPL-MCNC: 100 MG/DL (ref 70–99)
POTASSIUM SERPL-SCNC: 4.6 MMOL/L (ref 3.5–5.1)
SODIUM SERPL-SCNC: 139 MMOL/L (ref 136–145)
VIT B12 SERPL-MCNC: 600 PG/ML (ref 211–911)

## 2023-12-14 PROCEDURE — 99213 OFFICE O/P EST LOW 20 MIN: CPT | Performed by: FAMILY MEDICINE

## 2023-12-14 NOTE — PROGRESS NOTES
for speech difficulty and numbness (And tingling of hands, numbness of tongue). Negative for light-headedness and headaches. Psychiatric/Behavioral:  Negative for dysphoric mood. The patient is nervous/anxious. Physical Exam  Constitutional:       General: She is not in acute distress. Appearance: Normal appearance. HENT:      Head: Normocephalic and atraumatic. Nose: Nose normal. No congestion or rhinorrhea. Eyes:      General:         Right eye: No discharge. Left eye: No discharge. Extraocular Movements: Extraocular movements intact. Conjunctiva/sclera: Conjunctivae normal.   Cardiovascular:      Rate and Rhythm: Normal rate and regular rhythm. Heart sounds: Normal heart sounds. No murmur heard. Pulmonary:      Effort: Pulmonary effort is normal.      Breath sounds: Normal breath sounds. No wheezing. Musculoskeletal:         General: No swelling or tenderness. Normal range of motion. Cervical back: Normal range of motion and neck supple. Skin:     General: Skin is warm and dry. Neurological:      General: No focal deficit present. Mental Status: She is alert and oriented to person, place, and time. Cranial Nerves: No cranial nerve deficit. Sensory: No sensory deficit. Motor: No weakness. Coordination: Coordination normal.      Gait: Gait normal.      Deep Tendon Reflexes: Reflexes normal.   Psychiatric:         Mood and Affect: Mood normal.         Behavior: Behavior normal.           An electronic signature was used to authenticate this note.     --Chasity Cortes MD

## 2024-01-22 ENCOUNTER — TELEPHONE (OUTPATIENT)
Dept: PRIMARY CARE CLINIC | Age: 44
End: 2024-01-22

## 2024-02-02 DIAGNOSIS — N63.0 BREAST MASS IN FEMALE: Primary | ICD-10-CM

## 2024-03-06 RX ORDER — CITALOPRAM HYDROBROMIDE 10 MG/1
10 TABLET ORAL DAILY
Qty: 90 TABLET | Refills: 0 | Status: SHIPPED | OUTPATIENT
Start: 2024-03-06

## 2024-03-06 NOTE — TELEPHONE ENCOUNTER
Medication:   Requested Prescriptions     Pending Prescriptions Disp Refills    citalopram (CELEXA) 10 MG tablet 90 tablet 0     Sig: Take 1 tablet by mouth daily     Last Filled:  10.27.23    Last appt: 12/14/2023   Next appt: Visit date not found    Last OARRS:        No data to display

## 2024-06-03 RX ORDER — CITALOPRAM HYDROBROMIDE 10 MG/1
10 TABLET ORAL DAILY
Qty: 90 TABLET | Refills: 0 | Status: SHIPPED | OUTPATIENT
Start: 2024-06-03

## 2024-06-03 NOTE — TELEPHONE ENCOUNTER
Medication:   Requested Prescriptions     Pending Prescriptions Disp Refills    citalopram (CELEXA) 10 MG tablet [Pharmacy Med Name: Citalopram Hydrobromide 10 MG Oral Tablet] 90 tablet 0     Sig: Take 1 tablet by mouth once daily     Last Filled:  3/6/2024    Last appt: 12/14/2023   Next appt: Visit date not found    Last OARRS:        No data to display

## 2024-07-22 ENCOUNTER — OFFICE VISIT (OUTPATIENT)
Dept: PRIMARY CARE CLINIC | Age: 44
End: 2024-07-22
Payer: COMMERCIAL

## 2024-07-22 VITALS
BODY MASS INDEX: 22.86 KG/M2 | OXYGEN SATURATION: 98 % | DIASTOLIC BLOOD PRESSURE: 78 MMHG | HEART RATE: 70 BPM | WEIGHT: 129.04 LBS | SYSTOLIC BLOOD PRESSURE: 126 MMHG | TEMPERATURE: 97.1 F

## 2024-07-22 DIAGNOSIS — Z00.00 ENCOUNTER FOR ANNUAL PHYSICAL EXAM: ICD-10-CM

## 2024-07-22 DIAGNOSIS — Z00.00 ENCOUNTER FOR ANNUAL PHYSICAL EXAM: Primary | ICD-10-CM

## 2024-07-22 DIAGNOSIS — Z12.39 ENCOUNTER FOR BREAST CANCER SCREENING USING NON-MAMMOGRAM MODALITY: ICD-10-CM

## 2024-07-22 DIAGNOSIS — R10.9 ABDOMINAL CRAMPING: ICD-10-CM

## 2024-07-22 DIAGNOSIS — Z12.11 SCREEN FOR COLON CANCER: ICD-10-CM

## 2024-07-22 DIAGNOSIS — R92.30 DENSE BREAST TISSUE: ICD-10-CM

## 2024-07-22 DIAGNOSIS — Z23 NEED FOR TDAP VACCINATION: ICD-10-CM

## 2024-07-22 LAB
ALBUMIN SERPL-MCNC: 4.2 G/DL (ref 3.4–5)
ALBUMIN/GLOB SERPL: 1.7 {RATIO} (ref 1.1–2.2)
ALP SERPL-CCNC: 55 U/L (ref 40–129)
ALT SERPL-CCNC: 17 U/L (ref 10–40)
ANION GAP SERPL CALCULATED.3IONS-SCNC: 11 MMOL/L (ref 3–16)
AST SERPL-CCNC: 22 U/L (ref 15–37)
BACTERIA URNS QL MICRO: NORMAL /HPF
BASOPHILS # BLD: 0 K/UL (ref 0–0.2)
BASOPHILS NFR BLD: 1 %
BILIRUB SERPL-MCNC: <0.2 MG/DL (ref 0–1)
BILIRUB UR QL STRIP.AUTO: NEGATIVE
BUN SERPL-MCNC: 10 MG/DL (ref 7–20)
CALCIUM SERPL-MCNC: 9.1 MG/DL (ref 8.3–10.6)
CHLORIDE SERPL-SCNC: 102 MMOL/L (ref 99–110)
CHOLEST SERPL-MCNC: 228 MG/DL (ref 0–199)
CLARITY UR: CLEAR
CO2 SERPL-SCNC: 25 MMOL/L (ref 21–32)
COLOR UR: YELLOW
CREAT SERPL-MCNC: 0.8 MG/DL (ref 0.6–1.1)
DEPRECATED RDW RBC AUTO: 15.4 % (ref 12.4–15.4)
EOSINOPHIL # BLD: 0 K/UL (ref 0–0.6)
EOSINOPHIL NFR BLD: 1.1 %
EPI CELLS #/AREA URNS AUTO: 3 /HPF (ref 0–5)
GFR SERPLBLD CREATININE-BSD FMLA CKD-EPI: >90 ML/MIN/{1.73_M2}
GLUCOSE SERPL-MCNC: 91 MG/DL (ref 70–99)
GLUCOSE UR STRIP.AUTO-MCNC: NEGATIVE MG/DL
HCT VFR BLD AUTO: 34.6 % (ref 36–48)
HDLC SERPL-MCNC: 68 MG/DL (ref 40–60)
HGB BLD-MCNC: 11.6 G/DL (ref 12–16)
HGB UR QL STRIP.AUTO: NEGATIVE
HYALINE CASTS #/AREA URNS AUTO: 0 /LPF (ref 0–8)
KETONES UR STRIP.AUTO-MCNC: NEGATIVE MG/DL
LDLC SERPL CALC-MCNC: 147 MG/DL
LEUKOCYTE ESTERASE UR QL STRIP.AUTO: ABNORMAL
LYMPHOCYTES # BLD: 1.4 K/UL (ref 1–5.1)
LYMPHOCYTES NFR BLD: 31.4 %
MCH RBC QN AUTO: 26.5 PG (ref 26–34)
MCHC RBC AUTO-ENTMCNC: 33.5 G/DL (ref 31–36)
MCV RBC AUTO: 79.2 FL (ref 80–100)
MONOCYTES # BLD: 0.4 K/UL (ref 0–1.3)
MONOCYTES NFR BLD: 10 %
NEUTROPHILS # BLD: 2.5 K/UL (ref 1.7–7.7)
NEUTROPHILS NFR BLD: 56.5 %
NITRITE UR QL STRIP.AUTO: NEGATIVE
PH UR STRIP.AUTO: 7.5 [PH] (ref 5–8)
PLATELET # BLD AUTO: 232 K/UL (ref 135–450)
PMV BLD AUTO: 8.8 FL (ref 5–10.5)
POTASSIUM SERPL-SCNC: 4.5 MMOL/L (ref 3.5–5.1)
PROT SERPL-MCNC: 6.7 G/DL (ref 6.4–8.2)
PROT UR STRIP.AUTO-MCNC: NEGATIVE MG/DL
RBC # BLD AUTO: 4.37 M/UL (ref 4–5.2)
RBC CLUMPS #/AREA URNS AUTO: 1 /HPF (ref 0–4)
SODIUM SERPL-SCNC: 138 MMOL/L (ref 136–145)
SP GR UR STRIP.AUTO: 1 (ref 1–1.03)
TRIGL SERPL-MCNC: 66 MG/DL (ref 0–150)
TSH SERPL DL<=0.005 MIU/L-ACNC: 2.8 UIU/ML (ref 0.27–4.2)
UA COMPLETE W REFLEX CULTURE PNL UR: ABNORMAL
UA DIPSTICK W REFLEX MICRO PNL UR: YES
URN SPEC COLLECT METH UR: ABNORMAL
UROBILINOGEN UR STRIP-ACNC: 0.2 E.U./DL
VLDLC SERPL CALC-MCNC: 13 MG/DL
WBC # BLD AUTO: 4.3 K/UL (ref 4–11)
WBC #/AREA URNS AUTO: 3 /HPF (ref 0–5)

## 2024-07-22 PROCEDURE — 90471 IMMUNIZATION ADMIN: CPT | Performed by: STUDENT IN AN ORGANIZED HEALTH CARE EDUCATION/TRAINING PROGRAM

## 2024-07-22 PROCEDURE — 99396 PREV VISIT EST AGE 40-64: CPT | Performed by: STUDENT IN AN ORGANIZED HEALTH CARE EDUCATION/TRAINING PROGRAM

## 2024-07-22 PROCEDURE — 90715 TDAP VACCINE 7 YRS/> IM: CPT | Performed by: STUDENT IN AN ORGANIZED HEALTH CARE EDUCATION/TRAINING PROGRAM

## 2024-07-22 RX ORDER — CITALOPRAM HYDROBROMIDE 10 MG/1
10 TABLET ORAL DAILY
Qty: 90 TABLET | Refills: 3
Start: 2024-07-22

## 2024-07-22 ASSESSMENT — PATIENT HEALTH QUESTIONNAIRE - PHQ9
1. LITTLE INTEREST OR PLEASURE IN DOING THINGS: NOT AT ALL
SUM OF ALL RESPONSES TO PHQ9 QUESTIONS 1 & 2: 0
2. FEELING DOWN, DEPRESSED OR HOPELESS: NOT AT ALL

## 2024-07-22 NOTE — PROGRESS NOTES
2024    Laurita Blood (:  1980) is a 43 y.o. female, here for a preventive medicine evaluation.    Subjective   Patient Active Problem List   Diagnosis    Raynaud's phenomenon without gangrene    Pitting of nails    Generalized anxiety disorder    Family history of breast cancer     Needs 6 mth MRI and screen for breast cancer    No fam History of colon cancer   PGM breast cancer  Father history of melanoma  Mother and father HYPERTENSION    Will have abdominal cramping that will get so bad she will have to go home, has been going on for 15 years but feels it has worsened.  Unsure if food related not having constipation or diarrhea.    Menses: regular     Review of Systems   All other systems reviewed and are negative.      Prior to Visit Medications    Medication Sig Taking? Authorizing Provider   citalopram (CELEXA) 10 MG tablet Take 1 tablet by mouth daily Yes Tess Prieto MD   fluticasone (FLONASE) 50 MCG/ACT nasal spray 1 spray by Each Nostril route daily  Patient not taking: Reported on 2024  Tess Prieto MD        No Known Allergies    Past Medical History:   Diagnosis Date    Anxiety        Past Surgical History:   Procedure Laterality Date    BREAST LUMPECTOMY Right     US ASP BREAST CYST LEFT Left 2023    US ASP BREAST CYST LEFT 2023 [facility] ULTRASOUND       Social History     Socioeconomic History    Marital status:      Spouse name: Not on file    Number of children: Not on file    Years of education: Not on file    Highest education level: Not on file   Occupational History    Not on file   Tobacco Use    Smoking status: Never    Smokeless tobacco: Never   Substance and Sexual Activity    Alcohol use: Yes     Alcohol/week: 3.0 standard drinks of alcohol     Types: 3 Glasses of wine per week    Drug use: Never    Sexual activity: Not on file   Other Topics Concern    Not on file   Social History Narrative    Not on file     Social Determinants of Health

## 2024-07-25 DIAGNOSIS — D64.9 ANEMIA, UNSPECIFIED TYPE: ICD-10-CM

## 2024-07-25 DIAGNOSIS — D64.9 ANEMIA, UNSPECIFIED TYPE: Primary | ICD-10-CM

## 2024-07-25 LAB
FERRITIN SERPL IA-MCNC: 10 NG/ML (ref 15–150)
FOLATE SERPL-MCNC: 14.05 NG/ML (ref 4.78–24.2)
HCT VFR BLD AUTO: 36.3 % (ref 36–48)
IMMATURE RETIC FRACT: 0.35 (ref 0.21–0.37)
IRON SATN MFR SERPL: 11 % (ref 15–50)
IRON SERPL-MCNC: 54 UG/DL (ref 37–145)
RETICS # AUTO: 0.04 M/UL (ref 0.02–0.1)
RETICS/RBC NFR AUTO: 0.81 % (ref 0.5–2.18)
TIBC SERPL-MCNC: 475 UG/DL (ref 260–445)
VIT B12 SERPL-MCNC: 489 PG/ML (ref 211–911)

## 2024-07-25 NOTE — PROGRESS NOTES
Spoke with pt discussed results of labs, having some heavy periods but no bowel issues, abdominal pain is improved if passing gas and otherwise doing fine

## 2024-07-26 DIAGNOSIS — D50.0 IRON DEFICIENCY ANEMIA DUE TO CHRONIC BLOOD LOSS: Primary | ICD-10-CM

## 2024-07-26 RX ORDER — FERROUS GLUCONATE 324(38)MG
324 TABLET ORAL
Qty: 60 TABLET | Refills: 0 | Status: SHIPPED | OUTPATIENT
Start: 2024-07-26

## 2025-01-13 ENCOUNTER — OFFICE VISIT (OUTPATIENT)
Dept: PRIMARY CARE CLINIC | Age: 45
End: 2025-01-13
Payer: COMMERCIAL

## 2025-01-13 ENCOUNTER — PATIENT MESSAGE (OUTPATIENT)
Dept: PRIMARY CARE CLINIC | Age: 45
End: 2025-01-13

## 2025-01-13 VITALS
DIASTOLIC BLOOD PRESSURE: 86 MMHG | BODY MASS INDEX: 22.2 KG/M2 | OXYGEN SATURATION: 100 % | HEART RATE: 62 BPM | HEIGHT: 64 IN | SYSTOLIC BLOOD PRESSURE: 118 MMHG | TEMPERATURE: 96.9 F | WEIGHT: 130.01 LBS

## 2025-01-13 DIAGNOSIS — F41.1 GAD (GENERALIZED ANXIETY DISORDER): ICD-10-CM

## 2025-01-13 DIAGNOSIS — N92.0 MENORRHAGIA WITH REGULAR CYCLE: ICD-10-CM

## 2025-01-13 DIAGNOSIS — E61.1 IRON DEFICIENCY: ICD-10-CM

## 2025-01-13 DIAGNOSIS — F41.1 GAD (GENERALIZED ANXIETY DISORDER): Primary | ICD-10-CM

## 2025-01-13 PROCEDURE — 99214 OFFICE O/P EST MOD 30 MIN: CPT | Performed by: STUDENT IN AN ORGANIZED HEALTH CARE EDUCATION/TRAINING PROGRAM

## 2025-01-13 RX ORDER — CITALOPRAM HYDROBROMIDE 20 MG/1
TABLET ORAL
Qty: 30 TABLET | Refills: 0 | Status: SHIPPED | OUTPATIENT
Start: 2025-01-13

## 2025-01-13 RX ORDER — HYDROXYZINE HYDROCHLORIDE 25 MG/1
25 TABLET, FILM COATED ORAL NIGHTLY
Qty: 90 TABLET | Refills: 0 | Status: SHIPPED | OUTPATIENT
Start: 2025-01-13 | End: 2025-04-13

## 2025-01-13 SDOH — ECONOMIC STABILITY: FOOD INSECURITY: WITHIN THE PAST 12 MONTHS, YOU WORRIED THAT YOUR FOOD WOULD RUN OUT BEFORE YOU GOT MONEY TO BUY MORE.: NEVER TRUE

## 2025-01-13 SDOH — ECONOMIC STABILITY: FOOD INSECURITY: WITHIN THE PAST 12 MONTHS, THE FOOD YOU BOUGHT JUST DIDN'T LAST AND YOU DIDN'T HAVE MONEY TO GET MORE.: NEVER TRUE

## 2025-01-13 ASSESSMENT — PATIENT HEALTH QUESTIONNAIRE - PHQ9
SUM OF ALL RESPONSES TO PHQ QUESTIONS 1-9: 5
SUM OF ALL RESPONSES TO PHQ QUESTIONS 1-9: 5
10. IF YOU CHECKED OFF ANY PROBLEMS, HOW DIFFICULT HAVE THESE PROBLEMS MADE IT FOR YOU TO DO YOUR WORK, TAKE CARE OF THINGS AT HOME, OR GET ALONG WITH OTHER PEOPLE: VERY DIFFICULT
8. MOVING OR SPEAKING SO SLOWLY THAT OTHER PEOPLE COULD HAVE NOTICED. OR THE OPPOSITE, BEING SO FIGETY OR RESTLESS THAT YOU HAVE BEEN MOVING AROUND A LOT MORE THAN USUAL: NOT AT ALL
9. THOUGHTS THAT YOU WOULD BE BETTER OFF DEAD, OR OF HURTING YOURSELF: NOT AT ALL
3. TROUBLE FALLING OR STAYING ASLEEP: SEVERAL DAYS
1. LITTLE INTEREST OR PLEASURE IN DOING THINGS: NOT AT ALL
SUM OF ALL RESPONSES TO PHQ QUESTIONS 1-9: 5
2. FEELING DOWN, DEPRESSED OR HOPELESS: SEVERAL DAYS
5. POOR APPETITE OR OVEREATING: SEVERAL DAYS
4. FEELING TIRED OR HAVING LITTLE ENERGY: SEVERAL DAYS
SUM OF ALL RESPONSES TO PHQ9 QUESTIONS 1 & 2: 1
6. FEELING BAD ABOUT YOURSELF - OR THAT YOU ARE A FAILURE OR HAVE LET YOURSELF OR YOUR FAMILY DOWN: NOT AT ALL
SUM OF ALL RESPONSES TO PHQ QUESTIONS 1-9: 5
7. TROUBLE CONCENTRATING ON THINGS, SUCH AS READING THE NEWSPAPER OR WATCHING TELEVISION: SEVERAL DAYS

## 2025-01-13 NOTE — PROGRESS NOTES
No focal deficit present.      Mental Status: She is alert.      Cranial Nerves: No cranial nerve deficit.      Coordination: Coordination normal.   Psychiatric:         Mood and Affect: Mood normal.                  An electronic signature was used to authenticate this note.    --Tess Prieto MD

## 2025-01-13 NOTE — PATIENT INSTRUCTIONS
I do not know who is covered under your insurance but here are a few resources you can try to get an appointment.        [facility]  Go to website to look for a therapist  [email or web address]  This is great because you can put in insurance info and it will show all therapists that take your insurance and you can read the bio's and see which one would be a good fit.      Dr Christiano Darnell  [email or web address]  [facility]  [address]  [address]  [place] [address] [phone]      [facility] [place]  [email or web address]  Phone:: [phone]  Fax: [phone] [address] [id number]      [facility]  [email or web address]  [address]   [place] [address]       [phone]    [facility]  [address] [id number] [phone]  [email or web address]  [email or web address]

## 2025-01-14 DIAGNOSIS — E03.9 ACQUIRED HYPOTHYROIDISM: Primary | ICD-10-CM

## 2025-01-14 LAB
ALBUMIN SERPL-MCNC: 4.7 G/DL (ref 3.4–5)
ALBUMIN/GLOB SERPL: 2.1 {RATIO} (ref 1.1–2.2)
ALP SERPL-CCNC: 54 U/L (ref 40–129)
ALT SERPL-CCNC: 31 U/L (ref 10–40)
ANION GAP SERPL CALCULATED.3IONS-SCNC: 13 MMOL/L (ref 3–16)
AST SERPL-CCNC: 26 U/L (ref 15–37)
BASOPHILS # BLD: 0.1 K/UL (ref 0–0.2)
BASOPHILS NFR BLD: 1.6 %
BILIRUB SERPL-MCNC: 0.4 MG/DL (ref 0–1)
BUN SERPL-MCNC: 9 MG/DL (ref 7–20)
CALCIUM SERPL-MCNC: 9.7 MG/DL (ref 8.3–10.6)
CHLORIDE SERPL-SCNC: 104 MMOL/L (ref 99–110)
CO2 SERPL-SCNC: 24 MMOL/L (ref 21–32)
CREAT SERPL-MCNC: 0.7 MG/DL (ref 0.6–1.1)
DEPRECATED RDW RBC AUTO: 13.8 % (ref 12.4–15.4)
EOSINOPHIL # BLD: 0 K/UL (ref 0–0.6)
EOSINOPHIL NFR BLD: 0.6 %
FERRITIN SERPL IA-MCNC: 14.4 NG/ML (ref 15–150)
GFR SERPLBLD CREATININE-BSD FMLA CKD-EPI: >90 ML/MIN/{1.73_M2}
GLUCOSE SERPL-MCNC: 93 MG/DL (ref 70–99)
HCT VFR BLD AUTO: 43.6 % (ref 36–48)
HGB BLD-MCNC: 14.5 G/DL (ref 12–16)
IRON SATN MFR SERPL: 26 % (ref 15–50)
IRON SERPL-MCNC: 117 UG/DL (ref 37–145)
LYMPHOCYTES # BLD: 1.2 K/UL (ref 1–5.1)
LYMPHOCYTES NFR BLD: 23.2 %
MCH RBC QN AUTO: 29.4 PG (ref 26–34)
MCHC RBC AUTO-ENTMCNC: 33.3 G/DL (ref 31–36)
MCV RBC AUTO: 88.2 FL (ref 80–100)
MONOCYTES # BLD: 0.5 K/UL (ref 0–1.3)
MONOCYTES NFR BLD: 8.8 %
NEUTROPHILS # BLD: 3.5 K/UL (ref 1.7–7.7)
NEUTROPHILS NFR BLD: 65.8 %
PLATELET # BLD AUTO: 272 K/UL (ref 135–450)
PMV BLD AUTO: 8.7 FL (ref 5–10.5)
POTASSIUM SERPL-SCNC: 4.2 MMOL/L (ref 3.5–5.1)
PROT SERPL-MCNC: 6.9 G/DL (ref 6.4–8.2)
RBC # BLD AUTO: 4.94 M/UL (ref 4–5.2)
SODIUM SERPL-SCNC: 141 MMOL/L (ref 136–145)
T4 FREE SERPL-MCNC: 1 NG/DL (ref 0.9–1.8)
TIBC SERPL-MCNC: 452 UG/DL (ref 260–445)
TSH SERPL DL<=0.005 MIU/L-ACNC: 4.42 UIU/ML (ref 0.27–4.2)
WBC # BLD AUTO: 5.3 K/UL (ref 4–11)

## 2025-01-15 ENCOUNTER — TELEPHONE (OUTPATIENT)
Dept: PRIMARY CARE CLINIC | Age: 45
End: 2025-01-15

## 2025-01-15 DIAGNOSIS — E03.9 ACQUIRED HYPOTHYROIDISM: ICD-10-CM

## 2025-01-15 DIAGNOSIS — E61.1 IRON DEFICIENCY: Primary | ICD-10-CM

## 2025-01-15 LAB — THYROPEROXIDASE AB SERPL IA-ACNC: 11 IU/ML

## 2025-03-01 DIAGNOSIS — F41.1 GAD (GENERALIZED ANXIETY DISORDER): ICD-10-CM

## 2025-03-03 DIAGNOSIS — F41.1 GAD (GENERALIZED ANXIETY DISORDER): ICD-10-CM

## 2025-03-03 RX ORDER — CITALOPRAM HYDROBROMIDE 20 MG/1
TABLET ORAL
Qty: 30 TABLET | Refills: 0 | Status: SHIPPED | OUTPATIENT
Start: 2025-03-03

## 2025-03-03 RX ORDER — CITALOPRAM HYDROBROMIDE 20 MG/1
TABLET ORAL
Qty: 30 TABLET | Refills: 0 | OUTPATIENT
Start: 2025-03-03

## 2025-03-03 NOTE — TELEPHONE ENCOUNTER
Medication:   Requested Prescriptions     Pending Prescriptions Disp Refills    citalopram (CELEXA) 20 MG tablet [Pharmacy Med Name: Citalopram Hydrobromide 20 MG Oral Tablet] 30 tablet 0     Sig: TAKE 1/2 (ONE-HALF) TABLET BY MOUTH ONCE DAILY FOR 14 DAYS THEN IF WELL TOLERATED INCREASE TO 1 TABLET ONCE DAILY THEREAFTER     Last Filled:  1.13.25    Last appt: 1/13/2025   Next appt: Visit date not found    Last OARRS:        No data to display

## 2025-03-28 DIAGNOSIS — F41.1 GAD (GENERALIZED ANXIETY DISORDER): ICD-10-CM

## 2025-03-28 RX ORDER — CITALOPRAM HYDROBROMIDE 20 MG/1
TABLET ORAL
Qty: 30 TABLET | Refills: 0 | Status: SHIPPED | OUTPATIENT
Start: 2025-03-28

## 2025-03-28 NOTE — TELEPHONE ENCOUNTER
Medication:   Requested Prescriptions     Pending Prescriptions Disp Refills    citalopram (CELEXA) 20 MG tablet [Pharmacy Med Name: Citalopram Hydrobromide 20 MG Oral Tablet] 30 tablet 0     Sig: TAKE 1/2 (ONE-HALF) TABLET BY MOUTH ONCE DAILY FOR 14 DAYS THEN IF WELL TOLERATED INCREASE TO 1 TABLET ONCE DAILY     Last Filled:  3.3.25    Last appt: 1/13/2025   Next appt: Visit date not found    Last OARRS:        No data to display

## 2025-04-30 DIAGNOSIS — F41.1 GAD (GENERALIZED ANXIETY DISORDER): ICD-10-CM

## 2025-04-30 RX ORDER — CITALOPRAM HYDROBROMIDE 20 MG/1
20 TABLET ORAL DAILY
Qty: 90 TABLET | Refills: 0 | Status: SHIPPED | OUTPATIENT
Start: 2025-04-30

## 2025-04-30 NOTE — TELEPHONE ENCOUNTER
Medication:   Requested Prescriptions     Pending Prescriptions Disp Refills    citalopram (CELEXA) 20 MG tablet [Pharmacy Med Name: Citalopram Hydrobromide 20 MG Oral Tablet] 30 tablet 0     Sig: TAKE 1/2 (ONE-HALF) OF A TABLET BY MOUTH ONCE A DAY FOR 14 DAYS THEN INCREASE TO 1 TABLET ONCE A DAY IF WELL TOLERATED     Last Filled:  3.28.25    Last appt: 1/13/2025   Next appt: Visit date not found    Last OARRS:        No data to display

## 2025-07-19 DIAGNOSIS — F41.1 GAD (GENERALIZED ANXIETY DISORDER): ICD-10-CM

## 2025-07-21 RX ORDER — CITALOPRAM HYDROBROMIDE 20 MG/1
TABLET ORAL
Qty: 90 TABLET | Refills: 0 | Status: SHIPPED | OUTPATIENT
Start: 2025-07-21

## 2025-07-21 NOTE — TELEPHONE ENCOUNTER
Medication:   Requested Prescriptions     Pending Prescriptions Disp Refills    citalopram (CELEXA) 20 MG tablet [Pharmacy Med Name: Citalopram Hydrobromide 20 MG Oral Tablet] 90 tablet 0     Sig: TAKE 1/2 (ONE-HALF) OF A TABLET BY MOUTH ONCE A DAY FOR 14 DAYS THEN INCREASE TO 1 TABLET ONCE A DAY IF WELL TOLERATED     Last Filled:  4/20/2025    Last appt: 1/13/2025   Next appt: I called and left  for pt to return call.   Last OARRS:        No data to display

## 2025-08-14 ENCOUNTER — OFFICE VISIT (OUTPATIENT)
Dept: PRIMARY CARE CLINIC | Age: 45
End: 2025-08-14
Payer: COMMERCIAL

## 2025-08-14 VITALS
BODY MASS INDEX: 21.28 KG/M2 | WEIGHT: 124 LBS | DIASTOLIC BLOOD PRESSURE: 64 MMHG | HEART RATE: 77 BPM | OXYGEN SATURATION: 98 % | SYSTOLIC BLOOD PRESSURE: 120 MMHG

## 2025-08-14 DIAGNOSIS — F41.1 GAD (GENERALIZED ANXIETY DISORDER): ICD-10-CM

## 2025-08-14 DIAGNOSIS — N60.19 MULTIPLE CYSTS OF BREAST: Primary | ICD-10-CM

## 2025-08-14 DIAGNOSIS — Z12.39 ENCOUNTER FOR BREAST CANCER SCREENING USING NON-MAMMOGRAM MODALITY: ICD-10-CM

## 2025-08-14 DIAGNOSIS — R92.30 DENSE BREAST TISSUE: ICD-10-CM

## 2025-08-14 DIAGNOSIS — N92.0 MENORRHAGIA WITH REGULAR CYCLE: ICD-10-CM

## 2025-08-14 PROCEDURE — 99214 OFFICE O/P EST MOD 30 MIN: CPT | Performed by: STUDENT IN AN ORGANIZED HEALTH CARE EDUCATION/TRAINING PROGRAM

## 2025-08-14 RX ORDER — CITALOPRAM HYDROBROMIDE 20 MG/1
20 TABLET ORAL DAILY
Qty: 90 TABLET | Refills: 3 | Status: SHIPPED | OUTPATIENT
Start: 2025-08-14

## 2025-08-14 RX ORDER — FLUTICASONE PROPIONATE 50 MCG
1 SPRAY, SUSPENSION (ML) NASAL DAILY PRN
COMMUNITY